# Patient Record
Sex: FEMALE | Race: WHITE | Employment: OTHER | URBAN - METROPOLITAN AREA
[De-identification: names, ages, dates, MRNs, and addresses within clinical notes are randomized per-mention and may not be internally consistent; named-entity substitution may affect disease eponyms.]

---

## 2017-01-01 ENCOUNTER — DOCUMENTATION ONLY (OUTPATIENT)
Dept: CARE COORDINATION | Facility: CLINIC | Age: 82
End: 2017-01-01

## 2017-01-01 ENCOUNTER — CARE COORDINATION (OUTPATIENT)
Dept: GERIATRIC MEDICINE | Facility: CLINIC | Age: 82
End: 2017-01-01

## 2017-01-01 ENCOUNTER — MYC MEDICAL ADVICE (OUTPATIENT)
Dept: FAMILY MEDICINE | Facility: OTHER | Age: 82
End: 2017-01-01

## 2017-01-01 ENCOUNTER — TELEPHONE (OUTPATIENT)
Dept: FAMILY MEDICINE | Facility: OTHER | Age: 82
End: 2017-01-01

## 2017-01-01 ENCOUNTER — HOSPITAL ENCOUNTER (EMERGENCY)
Facility: CLINIC | Age: 82
Discharge: HOME | End: 2017-02-04
Attending: FAMILY MEDICINE | Admitting: FAMILY MEDICINE
Payer: COMMERCIAL

## 2017-01-01 ENCOUNTER — CARE COORDINATION (OUTPATIENT)
Dept: CARE COORDINATION | Facility: CLINIC | Age: 82
End: 2017-01-01

## 2017-01-01 ENCOUNTER — DOCUMENTATION ONLY (OUTPATIENT)
Dept: OTHER | Facility: CLINIC | Age: 82
End: 2017-01-01

## 2017-01-01 ENCOUNTER — OFFICE VISIT (OUTPATIENT)
Dept: FAMILY MEDICINE | Facility: OTHER | Age: 82
End: 2017-01-01
Payer: COMMERCIAL

## 2017-01-01 ENCOUNTER — RADIANT APPOINTMENT (OUTPATIENT)
Dept: GENERAL RADIOLOGY | Facility: OTHER | Age: 82
End: 2017-01-01
Attending: PHYSICIAN ASSISTANT
Payer: COMMERCIAL

## 2017-01-01 ENCOUNTER — TELEPHONE (OUTPATIENT)
Dept: CARE COORDINATION | Facility: CLINIC | Age: 82
End: 2017-01-01

## 2017-01-01 ENCOUNTER — VIRTUAL VISIT (OUTPATIENT)
Dept: PHARMACY | Facility: CLINIC | Age: 82
End: 2017-01-01
Payer: COMMERCIAL

## 2017-01-01 ENCOUNTER — NURSE TRIAGE (OUTPATIENT)
Dept: NURSING | Facility: CLINIC | Age: 82
End: 2017-01-01

## 2017-01-01 ENCOUNTER — ALLIED HEALTH/NURSE VISIT (OUTPATIENT)
Dept: PHARMACY | Facility: CLINIC | Age: 82
End: 2017-01-01
Payer: COMMERCIAL

## 2017-01-01 ENCOUNTER — MEDICAL CORRESPONDENCE (OUTPATIENT)
Dept: HEALTH INFORMATION MANAGEMENT | Facility: CLINIC | Age: 82
End: 2017-01-01

## 2017-01-01 ENCOUNTER — TELEPHONE (OUTPATIENT)
Dept: PHARMACY | Facility: CLINIC | Age: 82
End: 2017-01-01

## 2017-01-01 ENCOUNTER — RADIANT APPOINTMENT (OUTPATIENT)
Dept: GENERAL RADIOLOGY | Facility: OTHER | Age: 82
End: 2017-01-01
Attending: NURSE PRACTITIONER
Payer: COMMERCIAL

## 2017-01-01 ENCOUNTER — TELEPHONE (OUTPATIENT)
Dept: NURSING | Facility: CLINIC | Age: 82
End: 2017-01-01

## 2017-01-01 VITALS
WEIGHT: 123 LBS | OXYGEN SATURATION: 96 % | BODY MASS INDEX: 24.15 KG/M2 | SYSTOLIC BLOOD PRESSURE: 172 MMHG | HEIGHT: 60 IN | TEMPERATURE: 97.2 F | RESPIRATION RATE: 20 BRPM | DIASTOLIC BLOOD PRESSURE: 96 MMHG

## 2017-01-01 VITALS
RESPIRATION RATE: 16 BRPM | SYSTOLIC BLOOD PRESSURE: 116 MMHG | TEMPERATURE: 97.2 F | BODY MASS INDEX: 23.24 KG/M2 | HEART RATE: 80 BPM | WEIGHT: 119 LBS | DIASTOLIC BLOOD PRESSURE: 62 MMHG | OXYGEN SATURATION: 97 %

## 2017-01-01 VITALS
SYSTOLIC BLOOD PRESSURE: 108 MMHG | WEIGHT: 123 LBS | RESPIRATION RATE: 20 BRPM | OXYGEN SATURATION: 98 % | BODY MASS INDEX: 24.02 KG/M2 | HEART RATE: 77 BPM | TEMPERATURE: 95.4 F | DIASTOLIC BLOOD PRESSURE: 58 MMHG

## 2017-01-01 VITALS
OXYGEN SATURATION: 94 % | SYSTOLIC BLOOD PRESSURE: 120 MMHG | RESPIRATION RATE: 20 BRPM | HEART RATE: 102 BPM | DIASTOLIC BLOOD PRESSURE: 50 MMHG | TEMPERATURE: 98.9 F

## 2017-01-01 VITALS
SYSTOLIC BLOOD PRESSURE: 134 MMHG | RESPIRATION RATE: 24 BRPM | BODY MASS INDEX: 24.02 KG/M2 | DIASTOLIC BLOOD PRESSURE: 66 MMHG | OXYGEN SATURATION: 98 % | WEIGHT: 123 LBS | HEART RATE: 90 BPM | TEMPERATURE: 95.8 F

## 2017-01-01 VITALS
TEMPERATURE: 96.9 F | BODY MASS INDEX: 23.24 KG/M2 | DIASTOLIC BLOOD PRESSURE: 50 MMHG | HEART RATE: 90 BPM | OXYGEN SATURATION: 95 % | RESPIRATION RATE: 20 BRPM | WEIGHT: 119 LBS | SYSTOLIC BLOOD PRESSURE: 102 MMHG

## 2017-01-01 VITALS — TEMPERATURE: 98.3 F | SYSTOLIC BLOOD PRESSURE: 100 MMHG | DIASTOLIC BLOOD PRESSURE: 56 MMHG | HEART RATE: 84 BPM

## 2017-01-01 VITALS
DIASTOLIC BLOOD PRESSURE: 62 MMHG | BODY MASS INDEX: 23.24 KG/M2 | WEIGHT: 119 LBS | RESPIRATION RATE: 20 BRPM | SYSTOLIC BLOOD PRESSURE: 120 MMHG | TEMPERATURE: 97.6 F | OXYGEN SATURATION: 98 % | HEART RATE: 102 BPM

## 2017-01-01 DIAGNOSIS — R50.9 FEVER, UNSPECIFIED FEVER CAUSE: ICD-10-CM

## 2017-01-01 DIAGNOSIS — R21 RASH AND NONSPECIFIC SKIN ERUPTION: Primary | ICD-10-CM

## 2017-01-01 DIAGNOSIS — R52 PAIN: ICD-10-CM

## 2017-01-01 DIAGNOSIS — J30.2 SEASONAL ALLERGIC RHINITIS, UNSPECIFIED ALLERGIC RHINITIS TRIGGER: ICD-10-CM

## 2017-01-01 DIAGNOSIS — R30.0 DYSURIA: Primary | ICD-10-CM

## 2017-01-01 DIAGNOSIS — S63.92XA SPRAIN OF LEFT HAND, INITIAL ENCOUNTER: Primary | ICD-10-CM

## 2017-01-01 DIAGNOSIS — E11.9 TYPE 2 DIABETES MELLITUS WITHOUT COMPLICATION, WITHOUT LONG-TERM CURRENT USE OF INSULIN (H): ICD-10-CM

## 2017-01-01 DIAGNOSIS — N30.00 ACUTE CYSTITIS WITHOUT HEMATURIA: Primary | ICD-10-CM

## 2017-01-01 DIAGNOSIS — Z87.440 HISTORY OF FREQUENT URINARY TRACT INFECTIONS: ICD-10-CM

## 2017-01-01 DIAGNOSIS — R82.90 NONSPECIFIC FINDING ON EXAMINATION OF URINE: Primary | ICD-10-CM

## 2017-01-01 DIAGNOSIS — I10 HYPERTENSION GOAL BP (BLOOD PRESSURE) < 140/90: ICD-10-CM

## 2017-01-01 DIAGNOSIS — R35.0 URINARY FREQUENCY: ICD-10-CM

## 2017-01-01 DIAGNOSIS — E11.40 TYPE 2 DIABETES MELLITUS WITH DIABETIC NEUROPATHY, WITHOUT LONG-TERM CURRENT USE OF INSULIN (H): ICD-10-CM

## 2017-01-01 DIAGNOSIS — E03.9 ACQUIRED HYPOTHYROIDISM: ICD-10-CM

## 2017-01-01 DIAGNOSIS — L98.9 SKIN LESION: Primary | ICD-10-CM

## 2017-01-01 DIAGNOSIS — R73.9 HYPERGLYCEMIA: ICD-10-CM

## 2017-01-01 DIAGNOSIS — Z78.0 POST-MENOPAUSE: ICD-10-CM

## 2017-01-01 DIAGNOSIS — M25.50 MULTIPLE JOINT PAIN: Primary | ICD-10-CM

## 2017-01-01 DIAGNOSIS — N39.0 RECURRENT UTI (URINARY TRACT INFECTION): ICD-10-CM

## 2017-01-01 DIAGNOSIS — Q74.2 TOE ANOMALY: Primary | ICD-10-CM

## 2017-01-01 DIAGNOSIS — N39.0 URINARY TRACT INFECTION, SITE NOT SPECIFIED: ICD-10-CM

## 2017-01-01 DIAGNOSIS — N39.46 MIXED STRESS AND URGE URINARY INCONTINENCE: ICD-10-CM

## 2017-01-01 DIAGNOSIS — I25.10 CORONARY ARTERY DISEASE INVOLVING NATIVE HEART WITHOUT ANGINA PECTORIS, UNSPECIFIED VESSEL OR LESION TYPE: ICD-10-CM

## 2017-01-01 DIAGNOSIS — E63.9 NUTRITIONAL DEFICIENCY: ICD-10-CM

## 2017-01-01 DIAGNOSIS — D51.9 ANEMIA DUE TO VITAMIN B12 DEFICIENCY, UNSPECIFIED B12 DEFICIENCY TYPE: ICD-10-CM

## 2017-01-01 DIAGNOSIS — L24.9 IRRITANT CONTACT DERMATITIS, UNSPECIFIED TRIGGER: Primary | ICD-10-CM

## 2017-01-01 DIAGNOSIS — E22.2 SIADH (SYNDROME OF INAPPROPRIATE ADH PRODUCTION) (H): ICD-10-CM

## 2017-01-01 DIAGNOSIS — F25.9 SCHIZOAFFECTIVE DISORDER, UNSPECIFIED (H): ICD-10-CM

## 2017-01-01 DIAGNOSIS — N39.0 URINARY TRACT INFECTION WITHOUT HEMATURIA, SITE UNSPECIFIED: ICD-10-CM

## 2017-01-01 DIAGNOSIS — N39.0 URINARY TRACT INFECTION WITHOUT HEMATURIA, SITE UNSPECIFIED: Primary | ICD-10-CM

## 2017-01-01 DIAGNOSIS — E11.9 TYPE 2 DIABETES MELLITUS WITHOUT COMPLICATION, WITHOUT LONG-TERM CURRENT USE OF INSULIN (H): Primary | ICD-10-CM

## 2017-01-01 DIAGNOSIS — E11.40 TYPE 2 DIABETES MELLITUS WITH DIABETIC NEUROPATHY, WITHOUT LONG-TERM CURRENT USE OF INSULIN (H): Primary | ICD-10-CM

## 2017-01-01 DIAGNOSIS — N89.8 VAGINAL ITCHING: Primary | ICD-10-CM

## 2017-01-01 DIAGNOSIS — E78.5 HYPERLIPIDEMIA LDL GOAL <160: ICD-10-CM

## 2017-01-01 DIAGNOSIS — I10 HYPERTENSION GOAL BP (BLOOD PRESSURE) < 140/90: Primary | ICD-10-CM

## 2017-01-01 DIAGNOSIS — K59.00 CONSTIPATION, UNSPECIFIED CONSTIPATION TYPE: ICD-10-CM

## 2017-01-01 DIAGNOSIS — R56.9 SEIZURE (H): Primary | ICD-10-CM

## 2017-01-01 DIAGNOSIS — E11.65 TYPE 2 DIABETES MELLITUS WITH HYPERGLYCEMIA, WITHOUT LONG-TERM CURRENT USE OF INSULIN (H): ICD-10-CM

## 2017-01-01 DIAGNOSIS — E11.40 TYPE 2 DIABETES MELLITUS WITH DIABETIC NEUROPATHY (H): ICD-10-CM

## 2017-01-01 DIAGNOSIS — R19.7 DIARRHEA OF PRESUMED INFECTIOUS ORIGIN: Primary | ICD-10-CM

## 2017-01-01 DIAGNOSIS — R56.9 SEIZURE (H): ICD-10-CM

## 2017-01-01 DIAGNOSIS — N30.01 ACUTE CYSTITIS WITH HEMATURIA: Primary | ICD-10-CM

## 2017-01-01 DIAGNOSIS — R53.83 FATIGUE, UNSPECIFIED TYPE: ICD-10-CM

## 2017-01-01 DIAGNOSIS — E78.5 HYPERLIPIDEMIA LDL GOAL <100: ICD-10-CM

## 2017-01-01 DIAGNOSIS — E53.8 VITAMIN B 12 DEFICIENCY: Primary | ICD-10-CM

## 2017-01-01 DIAGNOSIS — E87.1 CHRONIC HYPONATREMIA: ICD-10-CM

## 2017-01-01 DIAGNOSIS — E78.5 HYPERLIPIDEMIA LDL GOAL <160: Primary | ICD-10-CM

## 2017-01-01 DIAGNOSIS — R19.7 DIARRHEA OF PRESUMED INFECTIOUS ORIGIN: ICD-10-CM

## 2017-01-01 DIAGNOSIS — N39.0 RECURRENT UTI (URINARY TRACT INFECTION): Primary | ICD-10-CM

## 2017-01-01 DIAGNOSIS — R30.0 DYSURIA: ICD-10-CM

## 2017-01-01 DIAGNOSIS — R41.82 ALTERED MENTAL STATUS, UNSPECIFIED ALTERED MENTAL STATUS TYPE: ICD-10-CM

## 2017-01-01 DIAGNOSIS — K59.00 CONSTIPATION, UNSPECIFIED CONSTIPATION TYPE: Primary | ICD-10-CM

## 2017-01-01 DIAGNOSIS — N39.46 MIXED STRESS AND URGE URINARY INCONTINENCE: Primary | ICD-10-CM

## 2017-01-01 DIAGNOSIS — E53.8 VITAMIN B 12 DEFICIENCY: ICD-10-CM

## 2017-01-01 DIAGNOSIS — I25.10 CAD (CORONARY ARTERY DISEASE): ICD-10-CM

## 2017-01-01 DIAGNOSIS — R21 RASH AND NONSPECIFIC SKIN ERUPTION: ICD-10-CM

## 2017-01-01 DIAGNOSIS — E87.1 HYPONATREMIA: Primary | ICD-10-CM

## 2017-01-01 DIAGNOSIS — R41.0 CONFUSION: Primary | ICD-10-CM

## 2017-01-01 DIAGNOSIS — K21.9 GASTROESOPHAGEAL REFLUX DISEASE WITHOUT ESOPHAGITIS: ICD-10-CM

## 2017-01-01 DIAGNOSIS — R41.0 DISORIENTATION: Primary | ICD-10-CM

## 2017-01-01 DIAGNOSIS — R41.89 COGNITIVE IMPAIRMENT: ICD-10-CM

## 2017-01-01 DIAGNOSIS — R41.82 ALTERED MENTAL STATUS, UNSPECIFIED ALTERED MENTAL STATUS TYPE: Primary | ICD-10-CM

## 2017-01-01 DIAGNOSIS — N30.01 ACUTE CYSTITIS WITH HEMATURIA: ICD-10-CM

## 2017-01-01 DIAGNOSIS — Z76.89 ESTABLISHING CARE WITH NEW DOCTOR, ENCOUNTER FOR: Primary | ICD-10-CM

## 2017-01-01 DIAGNOSIS — Z71.89 ADVANCED DIRECTIVES, COUNSELING/DISCUSSION: Chronic | ICD-10-CM

## 2017-01-01 DIAGNOSIS — Z53.9 ERRONEOUS ENCOUNTER--DISREGARD: Primary | ICD-10-CM

## 2017-01-01 DIAGNOSIS — J30.2 SEASONAL ALLERGIC RHINITIS: ICD-10-CM

## 2017-01-01 DIAGNOSIS — Q74.2 TOE ANOMALY: ICD-10-CM

## 2017-01-01 DIAGNOSIS — E11.8 TYPE 2 DIABETES MELLITUS WITH COMPLICATION, WITHOUT LONG-TERM CURRENT USE OF INSULIN (H): Primary | ICD-10-CM

## 2017-01-01 DIAGNOSIS — E11.9 TYPE 2 DIABETES, HBA1C GOAL < 7% (H): ICD-10-CM

## 2017-01-01 DIAGNOSIS — Z23 NEED FOR PROPHYLACTIC VACCINATION AND INOCULATION AGAINST INFLUENZA: ICD-10-CM

## 2017-01-01 DIAGNOSIS — Z87.440 HISTORY OF RECURRENT UTIS: ICD-10-CM

## 2017-01-01 DIAGNOSIS — M25.50 MULTIPLE JOINT PAIN: ICD-10-CM

## 2017-01-01 DIAGNOSIS — R50.9 FEVER, UNSPECIFIED FEVER CAUSE: Primary | ICD-10-CM

## 2017-01-01 DIAGNOSIS — R53.83 FATIGUE, UNSPECIFIED TYPE: Primary | ICD-10-CM

## 2017-01-01 LAB
ALBUMIN SERPL-MCNC: 2.8 G/DL (ref 3.4–5)
ALBUMIN UR-MCNC: 30 MG/DL
ALBUMIN UR-MCNC: >=300 MG/DL
ALBUMIN UR-MCNC: NEGATIVE MG/DL
ALP SERPL-CCNC: 74 U/L (ref 40–150)
ALT SERPL W P-5'-P-CCNC: 21 U/L (ref 0–50)
ANION GAP SERPL CALCULATED.3IONS-SCNC: 10 MMOL/L (ref 3–14)
ANION GAP SERPL CALCULATED.3IONS-SCNC: 10 MMOL/L (ref 3–14)
ANION GAP SERPL CALCULATED.3IONS-SCNC: 12 MMOL/L (ref 3–14)
ANION GAP SERPL CALCULATED.3IONS-SCNC: 7 MMOL/L (ref 3–14)
ANION GAP SERPL CALCULATED.3IONS-SCNC: 8 MMOL/L (ref 3–14)
ANION GAP SERPL CALCULATED.3IONS-SCNC: 9 MMOL/L (ref 3–14)
APPEARANCE UR: ABNORMAL
APPEARANCE UR: CLEAR
AST SERPL W P-5'-P-CCNC: 18 U/L (ref 0–45)
BACTERIA #/AREA URNS HPF: ABNORMAL /HPF
BACTERIA SPEC CULT: ABNORMAL
BACTERIA SPEC CULT: NORMAL
BILIRUB SERPL-MCNC: 0.3 MG/DL (ref 0.2–1.3)
BILIRUB UR QL STRIP: NEGATIVE
BUN SERPL-MCNC: 15 MG/DL (ref 7–30)
BUN SERPL-MCNC: 18 MG/DL (ref 7–30)
BUN SERPL-MCNC: 18 MG/DL (ref 7–30)
BUN SERPL-MCNC: 20 MG/DL (ref 7–30)
BUN SERPL-MCNC: 20 MG/DL (ref 7–30)
BUN SERPL-MCNC: 25 MG/DL (ref 7–30)
C COLI+JEJUNI+LARI FUSA STL QL NAA+PROBE: NOT DETECTED
C DIFF TOX B STL QL: NORMAL
CALCIUM SERPL-MCNC: 8.5 MG/DL (ref 8.5–10.1)
CALCIUM SERPL-MCNC: 8.8 MG/DL (ref 8.5–10.1)
CALCIUM SERPL-MCNC: 8.8 MG/DL (ref 8.5–10.1)
CALCIUM SERPL-MCNC: 8.9 MG/DL (ref 8.5–10.1)
CHLORIDE SERPL-SCNC: 91 MMOL/L (ref 94–109)
CHLORIDE SERPL-SCNC: 92 MMOL/L (ref 94–109)
CHLORIDE SERPL-SCNC: 93 MMOL/L (ref 94–109)
CHLORIDE SERPL-SCNC: 94 MMOL/L (ref 94–109)
CHLORIDE SERPL-SCNC: 96 MMOL/L (ref 94–109)
CHLORIDE SERPL-SCNC: 97 MMOL/L (ref 94–109)
CO2 SERPL-SCNC: 25 MMOL/L (ref 20–32)
CO2 SERPL-SCNC: 26 MMOL/L (ref 20–32)
CO2 SERPL-SCNC: 27 MMOL/L (ref 20–32)
CO2 SERPL-SCNC: 27 MMOL/L (ref 20–32)
CO2 SERPL-SCNC: 29 MMOL/L (ref 20–32)
CO2 SERPL-SCNC: 31 MMOL/L (ref 20–32)
COLOR UR AUTO: YELLOW
CREAT SERPL-MCNC: 0.52 MG/DL (ref 0.52–1.04)
CREAT SERPL-MCNC: 0.62 MG/DL (ref 0.52–1.04)
CREAT SERPL-MCNC: 0.65 MG/DL (ref 0.52–1.04)
CREAT SERPL-MCNC: 0.65 MG/DL (ref 0.52–1.04)
CREAT SERPL-MCNC: 0.66 MG/DL (ref 0.52–1.04)
CREAT SERPL-MCNC: 0.75 MG/DL (ref 0.52–1.04)
CREAT UR-MCNC: 30 MG/DL
EC STX1 GENE STL QL NAA+PROBE: NOT DETECTED
EC STX2 GENE STL QL NAA+PROBE: NOT DETECTED
ENTERIC PATHOGEN COMMENT: NORMAL
ERYTHROCYTE [DISTWIDTH] IN BLOOD BY AUTOMATED COUNT: 13.6 % (ref 10–15)
G LAMBLIA AG STL QL IA: NORMAL
GFR SERPL CREATININE-BSD FRML MDRD: 73 ML/MIN/1.7M2
GFR SERPL CREATININE-BSD FRML MDRD: 84 ML/MIN/1.7M2
GFR SERPL CREATININE-BSD FRML MDRD: 87 ML/MIN/1.7M2
GFR SERPL CREATININE-BSD FRML MDRD: 87 ML/MIN/1.7M2
GFR SERPL CREATININE-BSD FRML MDRD: >90 ML/MIN/1.7M2
GFR SERPL CREATININE-BSD FRML MDRD: ABNORMAL ML/MIN/1.7M2
GLUCOSE SERPL-MCNC: 116 MG/DL (ref 70–99)
GLUCOSE SERPL-MCNC: 135 MG/DL (ref 70–99)
GLUCOSE SERPL-MCNC: 157 MG/DL (ref 70–99)
GLUCOSE SERPL-MCNC: 180 MG/DL (ref 70–99)
GLUCOSE SERPL-MCNC: 202 MG/DL (ref 70–99)
GLUCOSE SERPL-MCNC: 241 MG/DL (ref 70–99)
GLUCOSE UR STRIP-MCNC: 50 MG/DL
GLUCOSE UR STRIP-MCNC: NEGATIVE MG/DL
HBA1C MFR BLD: 7 % (ref 4.3–6)
HBA1C MFR BLD: 7.8 % (ref 4.3–6)
HCT VFR BLD AUTO: 33.1 % (ref 35–47)
HGB BLD-MCNC: 11.3 G/DL (ref 11.7–15.7)
HGB UR QL STRIP: ABNORMAL
HGB UR QL STRIP: NEGATIVE
KETONES UR STRIP-MCNC: NEGATIVE MG/DL
LDLC SERPL DIRECT ASSAY-MCNC: 62 MG/DL
LEUKOCYTE ESTERASE UR QL STRIP: ABNORMAL
Lab: ABNORMAL
Lab: ABNORMAL
Lab: NORMAL
MCH RBC QN AUTO: 28 PG (ref 26.5–33)
MCHC RBC AUTO-ENTMCNC: 34.1 G/DL (ref 31.5–36.5)
MCV RBC AUTO: 82 FL (ref 78–100)
MICRO REPORT STATUS: ABNORMAL
MICRO REPORT STATUS: NORMAL
MICROALBUMIN UR-MCNC: 28 MG/L
MICROALBUMIN/CREAT UR: 93.96 MG/G CR (ref 0–25)
MICROORGANISM SPEC CULT: ABNORMAL
MUCOUS THREADS #/AREA URNS LPF: PRESENT /LPF
NITRATE UR QL: NEGATIVE
NITRATE UR QL: POSITIVE
NON-SQ EPI CELLS #/AREA URNS LPF: ABNORMAL /LPF
NOROV GI+II ORF1-ORF2 JNC STL QL NAA+PR: NOT DETECTED
O+P STL MICRO: NORMAL
PH UR STRIP: 5.5 PH (ref 5–7)
PH UR STRIP: 6 PH (ref 5–7)
PH UR STRIP: 6 PH (ref 5–7)
PH UR STRIP: 6.5 PH (ref 5–7)
PLATELET # BLD AUTO: 318 10E9/L (ref 150–450)
POTASSIUM SERPL-SCNC: 3.9 MMOL/L (ref 3.4–5.3)
POTASSIUM SERPL-SCNC: 4 MMOL/L (ref 3.4–5.3)
POTASSIUM SERPL-SCNC: 4.3 MMOL/L (ref 3.4–5.3)
POTASSIUM SERPL-SCNC: 4.3 MMOL/L (ref 3.4–5.3)
POTASSIUM SERPL-SCNC: 4.5 MMOL/L (ref 3.4–5.3)
POTASSIUM SERPL-SCNC: 4.6 MMOL/L (ref 3.4–5.3)
PROT SERPL-MCNC: 6.7 G/DL (ref 6.8–8.8)
RBC # BLD AUTO: 4.03 10E12/L (ref 3.8–5.2)
RBC #/AREA URNS AUTO: 4 /HPF (ref 0–2)
RBC #/AREA URNS AUTO: ABNORMAL /HPF
RBC #/AREA URNS AUTO: ABNORMAL /HPF
RBC #/AREA URNS AUTO: ABNORMAL /HPF (ref 0–2)
RVA NSP5 STL QL NAA+PROBE: NOT DETECTED
SALMONELLA SP RPOD STL QL NAA+PROBE: NOT DETECTED
SHIGELLA SP+EIEC IPAH STL QL NAA+PROBE: NOT DETECTED
SODIUM SERPL-SCNC: 127 MMOL/L (ref 133–144)
SODIUM SERPL-SCNC: 129 MMOL/L (ref 133–144)
SODIUM SERPL-SCNC: 130 MMOL/L (ref 133–144)
SODIUM SERPL-SCNC: 131 MMOL/L (ref 133–144)
SODIUM SERPL-SCNC: 133 MMOL/L (ref 133–144)
SODIUM SERPL-SCNC: 134 MMOL/L (ref 133–144)
SOURCE: ABNORMAL
SP GR UR STRIP: 1.01 (ref 1–1.03)
SP GR UR STRIP: 1.02 (ref 1–1.03)
SP GR UR STRIP: <=1.005 (ref 1–1.03)
SPECIMEN SOURCE: ABNORMAL
SPECIMEN SOURCE: NORMAL
SQUAMOUS #/AREA URNS AUTO: 7 /HPF (ref 0–1)
T4 FREE SERPL-MCNC: 1.11 NG/DL (ref 0.76–1.46)
TSH SERPL DL<=0.005 MIU/L-ACNC: 1.34 MU/L (ref 0.4–4)
URN SPEC COLLECT METH UR: ABNORMAL
UROBILINOGEN UR STRIP-ACNC: 0.2 EU/DL (ref 0.2–1)
UROBILINOGEN UR STRIP-MCNC: 0 MG/DL (ref 0–2)
V CHOL+PARA RFBL+TRKH+TNAA STL QL NAA+PR: NOT DETECTED
VIT B12 SERPL-MCNC: 843 PG/ML (ref 193–986)
WBC # BLD AUTO: 6.2 10E9/L (ref 4–11)
WBC #/AREA URNS AUTO: 4 /HPF (ref 0–2)
WBC #/AREA URNS AUTO: >100 /HPF
WBC #/AREA URNS AUTO: >100 /HPF (ref 0–2)
WBC #/AREA URNS AUTO: >100 /HPF (ref 0–2)
WBC #/AREA URNS AUTO: ABNORMAL /HPF
WBC #/AREA URNS AUTO: ABNORMAL /HPF (ref 0–2)
Y ENTERO RECN STL QL NAA+PROBE: NOT DETECTED
YEAST #/AREA URNS HPF: ABNORMAL /HPF

## 2017-01-01 PROCEDURE — 87086 URINE CULTURE/COLONY COUNT: CPT | Performed by: NURSE PRACTITIONER

## 2017-01-01 PROCEDURE — 87088 URINE BACTERIA CULTURE: CPT | Performed by: NURSE PRACTITIONER

## 2017-01-01 PROCEDURE — 99214 OFFICE O/P EST MOD 30 MIN: CPT | Performed by: NURSE PRACTITIONER

## 2017-01-01 PROCEDURE — 81001 URINALYSIS AUTO W/SCOPE: CPT | Performed by: NURSE PRACTITIONER

## 2017-01-01 PROCEDURE — 85027 COMPLETE CBC AUTOMATED: CPT | Performed by: NURSE PRACTITIONER

## 2017-01-01 PROCEDURE — 25000128 H RX IP 250 OP 636: Performed by: FAMILY MEDICINE

## 2017-01-01 PROCEDURE — 87086 URINE CULTURE/COLONY COUNT: CPT | Performed by: FAMILY MEDICINE

## 2017-01-01 PROCEDURE — 99283 EMERGENCY DEPT VISIT LOW MDM: CPT | Performed by: FAMILY MEDICINE

## 2017-01-01 PROCEDURE — 99284 EMERGENCY DEPT VISIT MOD MDM: CPT | Mod: 25

## 2017-01-01 PROCEDURE — 87186 SC STD MICRODIL/AGAR DIL: CPT | Performed by: FAMILY MEDICINE

## 2017-01-01 PROCEDURE — 81001 URINALYSIS AUTO W/SCOPE: CPT | Performed by: FAMILY MEDICINE

## 2017-01-01 PROCEDURE — 73660 X-RAY EXAM OF TOE(S): CPT | Mod: LT

## 2017-01-01 PROCEDURE — 87177 OVA AND PARASITES SMEARS: CPT | Performed by: NURSE PRACTITIONER

## 2017-01-01 PROCEDURE — 36415 COLL VENOUS BLD VENIPUNCTURE: CPT | Performed by: NURSE PRACTITIONER

## 2017-01-01 PROCEDURE — 99214 OFFICE O/P EST MOD 30 MIN: CPT | Performed by: PHYSICIAN ASSISTANT

## 2017-01-01 PROCEDURE — 87088 URINE BACTERIA CULTURE: CPT | Performed by: FAMILY MEDICINE

## 2017-01-01 PROCEDURE — 87088 URINE BACTERIA CULTURE: CPT | Performed by: INTERNAL MEDICINE

## 2017-01-01 PROCEDURE — 25000125 ZZHC RX 250: Performed by: FAMILY MEDICINE

## 2017-01-01 PROCEDURE — 84443 ASSAY THYROID STIM HORMONE: CPT | Performed by: NURSE PRACTITIONER

## 2017-01-01 PROCEDURE — 80048 BASIC METABOLIC PNL TOTAL CA: CPT | Performed by: FAMILY MEDICINE

## 2017-01-01 PROCEDURE — 99606 MTMS BY PHARM EST 15 MIN: CPT | Performed by: PHARMACIST

## 2017-01-01 PROCEDURE — 80048 BASIC METABOLIC PNL TOTAL CA: CPT | Performed by: NURSE PRACTITIONER

## 2017-01-01 PROCEDURE — 83036 HEMOGLOBIN GLYCOSYLATED A1C: CPT | Performed by: NURSE PRACTITIONER

## 2017-01-01 PROCEDURE — 83721 ASSAY OF BLOOD LIPOPROTEIN: CPT | Performed by: NURSE PRACTITIONER

## 2017-01-01 PROCEDURE — 99607 MTMS BY PHARM ADDL 15 MIN: CPT | Performed by: PHARMACIST

## 2017-01-01 PROCEDURE — 82043 UR ALBUMIN QUANTITATIVE: CPT | Performed by: NURSE PRACTITIONER

## 2017-01-01 PROCEDURE — 71020 XR CHEST 2 VW: CPT

## 2017-01-01 PROCEDURE — 82607 VITAMIN B-12: CPT | Performed by: FAMILY MEDICINE

## 2017-01-01 PROCEDURE — 87209 SMEAR COMPLEX STAIN: CPT | Performed by: NURSE PRACTITIONER

## 2017-01-01 PROCEDURE — 87186 SC STD MICRODIL/AGAR DIL: CPT | Performed by: INTERNAL MEDICINE

## 2017-01-01 PROCEDURE — G0008 ADMIN INFLUENZA VIRUS VAC: HCPCS | Performed by: NURSE PRACTITIONER

## 2017-01-01 PROCEDURE — 99214 OFFICE O/P EST MOD 30 MIN: CPT | Mod: 25 | Performed by: NURSE PRACTITIONER

## 2017-01-01 PROCEDURE — 87186 SC STD MICRODIL/AGAR DIL: CPT | Performed by: NURSE PRACTITIONER

## 2017-01-01 PROCEDURE — 84439 ASSAY OF FREE THYROXINE: CPT | Performed by: FAMILY MEDICINE

## 2017-01-01 PROCEDURE — 80048 BASIC METABOLIC PNL TOTAL CA: CPT | Performed by: INTERNAL MEDICINE

## 2017-01-01 PROCEDURE — 96372 THER/PROPH/DIAG INJ SC/IM: CPT

## 2017-01-01 PROCEDURE — 87086 URINE CULTURE/COLONY COUNT: CPT | Performed by: INTERNAL MEDICINE

## 2017-01-01 PROCEDURE — 87329 GIARDIA AG IA: CPT | Performed by: NURSE PRACTITIONER

## 2017-01-01 PROCEDURE — 87493 C DIFF AMPLIFIED PROBE: CPT | Performed by: NURSE PRACTITIONER

## 2017-01-01 PROCEDURE — 81001 URINALYSIS AUTO W/SCOPE: CPT | Performed by: INTERNAL MEDICINE

## 2017-01-01 PROCEDURE — 90662 IIV NO PRSV INCREASED AG IM: CPT | Performed by: NURSE PRACTITIONER

## 2017-01-01 PROCEDURE — 87506 IADNA-DNA/RNA PROBE TQ 6-11: CPT | Performed by: NURSE PRACTITIONER

## 2017-01-01 PROCEDURE — 80053 COMPREHEN METABOLIC PANEL: CPT | Performed by: NURSE PRACTITIONER

## 2017-01-01 RX ORDER — RAMIPRIL 5 MG/1
CAPSULE ORAL
Qty: 28 CAPSULE | Refills: 5 | Status: SHIPPED | OUTPATIENT
Start: 2017-01-01

## 2017-01-01 RX ORDER — MICONAZOLE NITRATE 2 %
1 CREAM WITH APPLICATOR VAGINAL AT BEDTIME
Qty: 45 G | Refills: 0 | Status: SHIPPED | OUTPATIENT
Start: 2017-01-01 | End: 2017-01-01

## 2017-01-01 RX ORDER — CEFTRIAXONE 1 G/1
INJECTION, POWDER, FOR SOLUTION INTRAMUSCULAR; INTRAVENOUS
Qty: 10 ML | Refills: 5 | Status: SHIPPED | OUTPATIENT
Start: 2017-01-01 | End: 2017-01-01

## 2017-01-01 RX ORDER — LEVOTHYROXINE SODIUM 50 UG/1
TABLET ORAL
Qty: 31 TABLET | Refills: 3 | Status: SHIPPED | OUTPATIENT
Start: 2017-01-01 | End: 2017-01-01

## 2017-01-01 RX ORDER — SILVER SULFADIAZINE 10 MG/G
CREAM TOPICAL 2 TIMES DAILY PRN
Qty: 50 G | Refills: 1 | Status: SHIPPED | OUTPATIENT
Start: 2017-01-01 | End: 2017-01-01

## 2017-01-01 RX ORDER — LIDOCAINE HYDROCHLORIDE 10 MG/ML
INJECTION, SOLUTION INFILTRATION; PERINEURAL
Qty: 20 ML | Refills: 1 | Status: SHIPPED | OUTPATIENT
Start: 2017-01-01 | End: 2017-01-01

## 2017-01-01 RX ORDER — LOVASTATIN 10 MG
TABLET ORAL
Qty: 28 TABLET | Refills: 0 | Status: SHIPPED | OUTPATIENT
Start: 2017-01-01

## 2017-01-01 RX ORDER — RAMIPRIL 10 MG/1
10 CAPSULE ORAL DAILY
Qty: 62 CAPSULE | Refills: 3 | Status: SHIPPED | OUTPATIENT
Start: 2017-01-01 | End: 2017-01-01

## 2017-01-01 RX ORDER — NITROFURANTOIN 25; 75 MG/1; MG/1
100 CAPSULE ORAL 2 TIMES DAILY
Qty: 14 CAPSULE | Refills: 0 | Status: SHIPPED | OUTPATIENT
Start: 2017-01-01

## 2017-01-01 RX ORDER — UNDERPADS 23" X 36"
EACH MISCELLANEOUS
Qty: 36 EACH | Refills: 0 | Status: SHIPPED | OUTPATIENT
Start: 2017-01-01 | End: 2017-01-01

## 2017-01-01 RX ORDER — LOVASTATIN 10 MG
TABLET ORAL
Qty: 30 TABLET | Refills: 0 | Status: SHIPPED | OUTPATIENT
Start: 2017-01-01 | End: 2017-01-01

## 2017-01-01 RX ORDER — MICONAZOLE NITRATE 2 %
1 CREAM WITH APPLICATOR VAGINAL AT BEDTIME
Qty: 30 G | Refills: 0 | Status: SHIPPED | OUTPATIENT
Start: 2017-01-01 | End: 2017-01-01

## 2017-01-01 RX ORDER — OMEGA-3S/DHA/EPA/FISH OIL/D3 300MG-1000
CAPSULE ORAL
Qty: 56 TABLET | Refills: 0 | Status: SHIPPED | OUTPATIENT
Start: 2017-01-01 | End: 2017-01-01

## 2017-01-01 RX ORDER — LEVOTHYROXINE SODIUM 50 UG/1
TABLET ORAL
Qty: 28 TABLET | Refills: 5 | Status: SHIPPED | OUTPATIENT
Start: 2017-01-01

## 2017-01-01 RX ORDER — AMINO ACIDS/PROTEIN HYDROLYS 15G-100/30
30 LIQUID (ML) ORAL 2 TIMES DAILY
COMMUNITY
End: 2017-01-01

## 2017-01-01 RX ORDER — LOVASTATIN 10 MG
TABLET ORAL
Refills: 0 | COMMUNITY
Start: 2017-01-01 | End: 2017-01-01

## 2017-01-01 RX ORDER — NITROFURANTOIN 25; 75 MG/1; MG/1
100 CAPSULE ORAL 2 TIMES DAILY
Qty: 14 CAPSULE | Refills: 0 | Status: SHIPPED | OUTPATIENT
Start: 2017-01-01 | End: 2017-01-01

## 2017-01-01 RX ORDER — CIPROFLOXACIN 250 MG/1
250 TABLET, FILM COATED ORAL 2 TIMES DAILY
Qty: 14 TABLET | Refills: 0 | Status: SHIPPED | OUTPATIENT
Start: 2017-01-01 | End: 2017-01-01

## 2017-01-01 RX ORDER — AMLODIPINE BESYLATE 5 MG/1
TABLET ORAL
Qty: 31 TABLET | Refills: 3 | Status: SHIPPED | OUTPATIENT
Start: 2017-01-01 | End: 2017-01-01

## 2017-01-01 RX ORDER — CYANOCOBALAMIN 1000 UG/ML
INJECTION, SOLUTION INTRAMUSCULAR; SUBCUTANEOUS
Qty: 1 ML | Refills: 0 | Status: SHIPPED | OUTPATIENT
Start: 2017-01-01 | End: 2017-01-01

## 2017-01-01 RX ORDER — UNDERPADS 23" X 36"
EACH MISCELLANEOUS
Qty: 36 EACH | Refills: 11 | Status: SHIPPED | OUTPATIENT
Start: 2017-01-01 | End: 2017-01-01

## 2017-01-01 RX ORDER — LOVASTATIN 10 MG
TABLET ORAL
Qty: 30 TABLET | Refills: 5 | Status: SHIPPED | OUTPATIENT
Start: 2017-01-01 | End: 2017-01-01

## 2017-01-01 RX ORDER — CEFTRIAXONE 1 G/1
1000 INJECTION, POWDER, FOR SOLUTION INTRAMUSCULAR; INTRAVENOUS DAILY
Qty: 50 ML | Refills: 0 | OUTPATIENT
Start: 2017-01-01 | End: 2017-01-01

## 2017-01-01 RX ORDER — BETAMETHASONE DIPROPIONATE 0.5 MG/G
CREAM TOPICAL
Qty: 30 G | Refills: 0 | Status: SHIPPED | OUTPATIENT
Start: 2017-01-01

## 2017-01-01 RX ORDER — CYANOCOBALAMIN 1000 UG/ML
INJECTION, SOLUTION INTRAMUSCULAR; SUBCUTANEOUS
Qty: 1 ML | Refills: 3 | Status: SHIPPED | OUTPATIENT
Start: 2017-01-01

## 2017-01-01 RX ORDER — OMEGA-3S/DHA/EPA/FISH OIL/D3 300MG-1000
CAPSULE ORAL
Qty: 56 TABLET | Refills: 5 | Status: SHIPPED | OUTPATIENT
Start: 2017-01-01

## 2017-01-01 RX ORDER — AMLODIPINE BESYLATE 5 MG/1
TABLET ORAL
Qty: 28 TABLET | Refills: 5 | Status: SHIPPED | OUTPATIENT
Start: 2017-01-01

## 2017-01-01 RX ORDER — CLOTRIMAZOLE 1 %
CREAM (GRAM) TOPICAL
Qty: 30 G | Refills: 0 | Status: SHIPPED | OUTPATIENT
Start: 2017-01-01

## 2017-01-01 RX ORDER — RAMIPRIL 5 MG/1
5 CAPSULE ORAL DAILY
Qty: 30 CAPSULE | Refills: 3 | Status: SHIPPED | OUTPATIENT
Start: 2017-01-01 | End: 2017-01-01

## 2017-01-01 RX ORDER — PYRIDOXINE HCL (VITAMIN B6) 100 MG
2 TABLET ORAL 2 TIMES DAILY
Qty: 200 CAPSULE | Refills: 6 | Status: SHIPPED | OUTPATIENT
Start: 2017-01-01

## 2017-01-01 RX ORDER — METFORMIN HCL 500 MG
1000 TABLET, EXTENDED RELEASE 24 HR ORAL
Qty: 30 TABLET | Refills: 1 | Status: SHIPPED | OUTPATIENT
Start: 2017-01-01 | End: 2017-01-01

## 2017-01-01 RX ORDER — AMINO ACIDS/PROTEIN HYDROLYS 15G-100/30
LIQUID (ML) ORAL
Qty: 887 ML | Refills: 0 | Status: SHIPPED | OUTPATIENT
Start: 2017-01-01

## 2017-01-01 RX ORDER — OMEGA-3S/DHA/EPA/FISH OIL/D3 300MG-1000
CAPSULE ORAL
Qty: 180 TABLET | Refills: 1 | Status: SHIPPED | OUTPATIENT
Start: 2017-01-01 | End: 2017-01-01

## 2017-01-01 RX ORDER — CYANOCOBALAMIN 1000 UG/ML
INJECTION, SOLUTION INTRAMUSCULAR; SUBCUTANEOUS
Qty: 1 ML | Refills: 4 | Status: SHIPPED | OUTPATIENT
Start: 2017-01-01 | End: 2017-01-01

## 2017-01-01 RX ORDER — DIAPER,BRIEF,ADULT, DISPOSABLE
EACH MISCELLANEOUS
Qty: 140 EACH | Refills: 0 | Status: SHIPPED | OUTPATIENT
Start: 2017-01-01

## 2017-01-01 RX ORDER — AMINO ACIDS/PROTEIN HYDROLYS 15G-100/30
LIQUID (ML) ORAL
Qty: 887 ML | Refills: 0 | Status: SHIPPED | OUTPATIENT
Start: 2017-01-01 | End: 2017-01-01

## 2017-01-01 RX ORDER — SILVER SULFADIAZINE 10 MG/G
CREAM TOPICAL 2 TIMES DAILY
Qty: 50 G | Refills: 1 | Status: SHIPPED | OUTPATIENT
Start: 2017-01-01 | End: 2017-01-01

## 2017-01-01 RX ORDER — CEFTRIAXONE 1 G/1
1000 INJECTION, POWDER, FOR SOLUTION INTRAMUSCULAR; INTRAVENOUS DAILY
Qty: 50 ML | Refills: 0 | Status: SHIPPED | OUTPATIENT
Start: 2017-01-01 | End: 2017-01-01

## 2017-01-01 RX ORDER — METFORMIN HCL 500 MG
TABLET, EXTENDED RELEASE 24 HR ORAL
Qty: 56 TABLET | Refills: 0 | Status: SHIPPED | OUTPATIENT
Start: 2017-01-01

## 2017-01-01 RX ORDER — CEFTRIAXONE 1 G/1
1000 INJECTION, POWDER, FOR SOLUTION INTRAMUSCULAR; INTRAVENOUS DAILY
Qty: 50 ML | Refills: 0 | OUTPATIENT
Start: 2017-01-01

## 2017-01-01 RX ORDER — AMINO ACIDS/PROTEIN HYDROLYS 15G-100/30
30 LIQUID (ML) ORAL 2 TIMES DAILY
Qty: 887 ML | Refills: 1 | Status: SHIPPED | OUTPATIENT
Start: 2017-01-01 | End: 2017-01-01

## 2017-01-01 RX ADMIN — LIDOCAINE HYDROCHLORIDE 1 G: 10 INJECTION, SOLUTION INFILTRATION; PERINEURAL at 21:02

## 2017-01-01 ASSESSMENT — PAIN SCALES - GENERAL
PAINLEVEL: NO PAIN (0)
PAINLEVEL: NO PAIN (0)

## 2017-01-01 ASSESSMENT — ENCOUNTER SYMPTOMS: FEVER: 0

## 2017-01-03 ENCOUNTER — CARE COORDINATION (OUTPATIENT)
Dept: GERIATRIC MEDICINE | Facility: CLINIC | Age: 82
End: 2017-01-03

## 2017-01-03 NOTE — PROGRESS NOTES
1-3-17  Received a copy of the review from Sultana Vance DAC from [facility] held on 12-14-16  See chart for details.  Dolores Bashir RN PHN  [facility]   [phone]

## 2017-01-04 DIAGNOSIS — B37.31 YEAST INFECTION OF THE VAGINA: Primary | ICD-10-CM

## 2017-01-04 RX ORDER — MICONAZOLE NITRATE 2 %
1 CREAM WITH APPLICATOR VAGINAL AT BEDTIME
Qty: 30 G | Refills: 0 | Status: SHIPPED | OUTPATIENT
Start: 2017-01-04 | End: 2017-01-11

## 2017-01-06 ENCOUNTER — MYC MEDICAL ADVICE (OUTPATIENT)
Dept: FAMILY MEDICINE | Facility: OTHER | Age: 82
End: 2017-01-06
Payer: COMMERCIAL

## 2017-01-06 PROCEDURE — 99207 ZZC NO BILLABLE SERVICE THIS VISIT: CPT | Performed by: FAMILY MEDICINE

## 2017-01-09 DIAGNOSIS — B37.31 YEAST INFECTION OF THE VAGINA: Primary | ICD-10-CM

## 2017-01-11 ENCOUNTER — RADIANT APPOINTMENT (OUTPATIENT)
Dept: GENERAL RADIOLOGY | Facility: OTHER | Age: 82
End: 2017-01-11
Attending: INTERNAL MEDICINE
Payer: COMMERCIAL

## 2017-01-11 ENCOUNTER — OFFICE VISIT (OUTPATIENT)
Dept: FAMILY MEDICINE | Facility: OTHER | Age: 82
End: 2017-01-11
Payer: COMMERCIAL

## 2017-01-11 ENCOUNTER — TELEPHONE (OUTPATIENT)
Dept: NURSING | Facility: CLINIC | Age: 82
End: 2017-01-11

## 2017-01-11 VITALS
HEART RATE: 72 BPM | SYSTOLIC BLOOD PRESSURE: 82 MMHG | TEMPERATURE: 96.6 F | OXYGEN SATURATION: 100 % | DIASTOLIC BLOOD PRESSURE: 58 MMHG

## 2017-01-11 DIAGNOSIS — L98.9 SKIN LESION: ICD-10-CM

## 2017-01-11 DIAGNOSIS — M25.432 WRIST SWELLING, LEFT: ICD-10-CM

## 2017-01-11 DIAGNOSIS — R41.0 DISORIENTATION: ICD-10-CM

## 2017-01-11 DIAGNOSIS — M25.432 WRIST SWELLING, LEFT: Primary | ICD-10-CM

## 2017-01-11 LAB
ANION GAP SERPL CALCULATED.3IONS-SCNC: 10 MMOL/L (ref 3–14)
BUN SERPL-MCNC: 19 MG/DL (ref 7–30)
CALCIUM SERPL-MCNC: 8.5 MG/DL (ref 8.5–10.1)
CHLORIDE SERPL-SCNC: 94 MMOL/L (ref 94–109)
CO2 SERPL-SCNC: 25 MMOL/L (ref 20–32)
CREAT SERPL-MCNC: 0.64 MG/DL (ref 0.52–1.04)
GFR SERPL CREATININE-BSD FRML MDRD: 88 ML/MIN/1.7M2
GLUCOSE SERPL-MCNC: 237 MG/DL (ref 70–99)
POTASSIUM SERPL-SCNC: 4.2 MMOL/L (ref 3.4–5.3)
SODIUM SERPL-SCNC: 129 MMOL/L (ref 133–144)

## 2017-01-11 PROCEDURE — 36415 COLL VENOUS BLD VENIPUNCTURE: CPT | Performed by: INTERNAL MEDICINE

## 2017-01-11 PROCEDURE — 99214 OFFICE O/P EST MOD 30 MIN: CPT | Performed by: INTERNAL MEDICINE

## 2017-01-11 PROCEDURE — 73110 X-RAY EXAM OF WRIST: CPT | Mod: LT

## 2017-01-11 PROCEDURE — 80048 BASIC METABOLIC PNL TOTAL CA: CPT | Performed by: INTERNAL MEDICINE

## 2017-01-11 RX ORDER — SILVER SULFADIAZINE 10 MG/G
CREAM TOPICAL 2 TIMES DAILY
Qty: 50 G | Refills: 1 | Status: SHIPPED | OUTPATIENT
Start: 2017-01-11 | End: 2017-01-01

## 2017-01-11 ASSESSMENT — PAIN SCALES - GENERAL: PAINLEVEL: SEVERE PAIN (6)

## 2017-01-11 NOTE — PROGRESS NOTES
Chief Complaint   Patient presents with     Wrist left     swollen     CHIEF COMPLAINT:    The patient is an 86-year-old female who lives at the [facility] home. She recently had a slight fall and landed on her left wrist. There is some swelling and a small amount of hematoma noted. X-rays are performed which demonstrate no acute fracture. Have recommended limited use of the arm. Splinting will be unnecessary as the patient will try to take the splint off. Her caregiver notes that she has also had some slight decrease in her mental status. She does have significant dementia but this is apparently outside of her ordinary. She has a history of previous hyponatremia as well as urinary tract infections which have caused similar symptoms.                         PAST, FAMILY,SOCIAL HISTORY:     Medical  History:   has a past medical history of Sprain of hand, unspecified site (05/05/95); Closed fracture of metatarsal bone(s) (09/15/94); Open wound of finger(s) , without mention of complication (06/13/94); Attention to dressings and sutures (06/20/94); Contact dermatitis and other eczema, due to unspecified cause (05/03/94); Type II or unspecified type diabetes mellitus without mention of complication, not stated as uncontrolled (11/18/93); Hypoglycemia, unspecified (10/11/93); Cellulitis and abscess of upper arm and forearm (11/02/92); Unspecified schizophrenia, unspecified condition (10/12/92); Other malaise and fatigue (9/14/2005); Hyposmolality and/or hyponatremia (08/23/2007); Depressive disorder; Unspecified cerebral artery occlusion with cerebral infarction; Pneumonia (12/30/2014); Sepsis (H) (12/30/2014); and History of skin cancer (1/15/2015).     Surgical History:   has past surgical history that includes REMV CATARACT EXTRACAP,INSERT LENS (02/20/2003); REMV CATARACT EXTRACAP,INSERT LENS (1/16/2003); colonoscopy (05/18/09); and Ankle surgery.     Social History:   reports that she has never smoked.  She has never used smokeless tobacco. She reports that she does not drink alcohol or use illicit drugs.     Family History:  family history includes Breast Cancer in her sister; CEREBROVASCULAR DISEASE in her brother; Cardiovascular in her mother; DIABETES in her mother; HEART DISEASE in her sister; Neurologic Disorder in her brother.            MEDICATIONS  Current Outpatient Prescriptions   Medication Sig Dispense Refill     silver sulfADIAZINE (SILVADENE) 1 % cream Apply topically 2 times daily 50 g 1     miconazole ([employer] MICONAZOLE 7) 2 % cream Place 1 applicator vaginally At Bedtime for 7 days 30 g 0     Alcohol Swabs (B-D SINGLE USE SWABS REGULAR) PADS FOR DIABETIC TESTING AS ORDERED 100 each 0     levothyroxine (SYNTHROID/LEVOTHROID) 50 MCG tablet TAKE 1 TABLET BY MOUTH ONCE DAILY 31 tablet 0     metFORMIN (GLUCOPHAGE) 850 MG tablet TAKE 1 CAPSULE BY MOUTH TWI CE A DAY WITH MEALS 62 tablet 0     amLODIPine (NORVASC) 5 MG tablet TAKE 1 TABLET BY MOUTH EVERY DAY *HIGH BLOOD PRESSURE* 31 tablet 3     lovastatin (MEVACOR) 20 MG tablet 1/2  TABLET DAILY at bedtime 45 tablet 1     blood glucose monitoring (ONE TOUCH ULTRA) test strip by In Vitro route daily 100 each prn     NITROSTAT 0.4 MG SL tablet DISSOLVE 1 TAB UNDER TONGUE EVERY 5 MINUTES AS NEEDED FOR CHEST PAINMAY REPEAT F OR UP TO 3 DOSES. 25 tablet PRN     acetaminophen (TYLENOL) 500 MG tablet Take 1,000 mg by mouth every 6 hours as needed for mild pain       PSEUDOEPHEDRINE HCL PO Take 2 tablets by mouth every 4 hours as needed       guaiFENesin (TUSSIN) 100 MG/5ML LIQD Take 10 mLs by mouth every 4 hours as needed for cough       Skin Protectants, Misc. (NO STING BARRIER FILM) MISC No Sting Barrier Film Spray Apply as directed 1 each 11     calcium-vitamin D (CALTRATE) 600-400 MG-UNIT per tablet TAKE 1 TABLET BY MOUTH EVERY DAY (CALCIUM DEFICIANCY) 31 tablet 11     Elastic Bandages & Supports (T.E.D. KNEE LENGTH/S-REGULAR) MISC 1 Package daily 1 each 2      Sennosides-Docusate Sodium (SENNA-DOCUSATE SODIUM) 8.6-50 MG TABS TAKE 1 TABLET BY MOUTH EVERY DAY AS NEEDED FOR CONSTIPATION 30 tablet 5     polyethylene glycol (MIRALAX) powder Take 17 g (1 capful) by mouth every other day 510 g 1     albuterol (2.5 MG/3ML) 0.083% nebulizer solution Take 1 vial (2.5 mg) by nebulization every 6 hours as needed for shortness of breath / dyspnea or wheezing 30 vial 0     ramipril (ALTACE) 10 MG capsule Take 1 capsule (10 mg) by mouth daily 62 capsule 3     cyanocobalamin (VITAMIN B12) 1000 MCG/ML injection Inject 1 mL into the muscle every 30 days       bismuth subsalicylate (PEPTO BISMOL) 262 MG/15ML suspension Take 10 mLs by mouth every 4 hours as needed for indigestion Two TBLS as needed       blood glucose monitoring (ONE TOUCH DELICA) lancets USE TO TEST BLOOD SUGAR EVERY DAY OR AS DIRECTED 1 Box 3     Cholecalciferol 400 UNITS CAPS Take 2 capsules by mouth daily 180 capsule 3     Cranberry (CRANBERRY CONCENTRATE) 500 MG CAPS Take 2 capsules (1,000 mg) by mouth 2 times daily 200 capsule 6     blood glucose calibration (ONETOUCH ULTRA CONTROL) solution USE TO CALIBRATE BLOOD GLUCOSE MONITOR WHEN STARTING A NEW VIAL OFSTRIPS 1 each 3     loperamide (IMODIUM A-D) 2 MG tablet Start with 2 tabs (4 mg), then take one tab (2 mg) after each diarrheal stool.  Do not use more than  8 tabs (16 mg) per day. 30 tablet 0     ORDER FOR DME Equipment being ordered: 2 x 2 allevyn gentle foam dressing  To be used twice daily to open area 4 Package 6     timolol (TIMOPTIC) 0.5 % ophthalmic solution Place 1 drop into both eyes daily        Skin Protectants, Misc. (EUCERIN) cream Use daily at bedtime on feet and twice daily on tailbone 454 g 0     INCONTINENCE BRIEF MEDIUM MISC use at bedtime for incontinence 1 case prn         --------------------------------------------------------------------------------------------------------------------                          REVIEW OF SYSTEMS:        Is as  noted above per her care provider                            EXAMINATION:        Vital Signs:  B/P: 82/58, T: 96.6, P: 72, R: Data Unavailable, BMI: There is no weight on file to calculate BMI.   Constitutional: The patient appears to be in no acute distress. The patient appears to be adequately hydrated. No acute respiratory or hemodynamic distress is noted at this time.   LUNGS: clear bilaterally, airflow is brisk, no intercostal retraction or stridor is noted. No coughing is noted during visit.   HEART:  regular without rubs, clicks, gallops. PMI is nondisplaced. Upstrokes are brisk. S1,S2 are heard. Systolic murmur consistent with her known mitral regurgitation is noted.   PSYCH: The patient appears grossly unaware. Maintains poor eye contact, does not have any jittery or atypical motion. Displays detached affect. His non-conversational and simply stares off into space.   MS: Minimal crepitance is noted in the extremities. No deformity is present. Muscle strength is appropriate and equal bilaterally. No acute joint erythema or swelling is present.  No deformity or angulation of the wrist is noted. There is significant swelling with modest ecchymosis. No distal neurovascular involvement is seen.   SKIN:  warm and dry. No erythema, or rashes are noted. A small slightly cornified/non-erythematous lesion on her upper right tibia is noted.                        DECISION MAKING:       #1 swollen left wrist   X-ray performed and unremarkable  await official reading   Elevate as needed  #2 increased disorientation   Check electrolytes and urinalysis  #3 small skin lesion on anterior left upper tibia   Patient already has a dermatology appointment for this week.                           FOLLOW UP    I have asked the patient to make an appointment for follow up with her primary care provider as needed    I have carefully explained the diagnosis and treatment options with the patient. The patient has displayed an  understanding of the above, and all subsequent questions were answered.       DO OSIRIS Alicia    Portions of this note were produced using [employer]  Although every attempt at real-time proof reading has been made, occasional grammar/syntax errors may have been missed.

## 2017-01-11 NOTE — NURSING NOTE
Chief Complaint   Patient presents with     Wrist left     swollen       Initial BP 82/58 mmHg  Pulse 72  Temp(Src) 96.6  F (35.9  C) (Temporal)  Ht   Wt   SpO2 100% Estimated body mass index is 23.90 kg/(m^2) as calculated from the following:    Height as of 11/29/16: 5' (1.524 m).    Weight as of 11/29/16: 122 lb 6.4 oz (55.52 kg).  BP completed using cuff size: dakota CARPIO

## 2017-01-12 ENCOUNTER — OFFICE VISIT (OUTPATIENT)
Dept: FAMILY MEDICINE | Facility: OTHER | Age: 82
End: 2017-01-12
Payer: COMMERCIAL

## 2017-01-12 VITALS
TEMPERATURE: 97.9 F | SYSTOLIC BLOOD PRESSURE: 110 MMHG | DIASTOLIC BLOOD PRESSURE: 56 MMHG | HEART RATE: 84 BPM | RESPIRATION RATE: 20 BRPM

## 2017-01-12 DIAGNOSIS — B35.4 TINEA CORPORIS: Primary | ICD-10-CM

## 2017-01-12 PROCEDURE — 99212 OFFICE O/P EST SF 10 MIN: CPT | Performed by: FAMILY MEDICINE

## 2017-01-12 RX ORDER — CLOTRIMAZOLE AND BETAMETHASONE DIPROPIONATE 10; .64 MG/G; MG/G
CREAM TOPICAL 2 TIMES DAILY
Qty: 60 G | Refills: 1 | Status: SHIPPED | OUTPATIENT
Start: 2017-01-12 | End: 2017-01-01

## 2017-01-12 ASSESSMENT — PAIN SCALES - GENERAL: PAINLEVEL: NO PAIN (0)

## 2017-01-12 NOTE — PROGRESS NOTES
SUBJECTIVE:                                                    Maggi Evans is a 86 year old female who presents to clinic today for the following health issues:      Rash      Duration: 2 weeks    Description  Location: btw thighs and on back  Itching: mild?    Intensity: none    Accompanying signs and symptoms: thigh area somewhat raw    History (similar episodes/previous evaluation): None    Precipitating or alleviating factors:  New exposures:  None  Recent travel: no      Therapies tried and outcome: moisture barrier cream and ecucerin cream         Rash on left back and upper inner thighs  /56 mmHg  Pulse 84  Temp(Src) 97.9  F (36.6  C) (Temporal)  Resp 20  Ht   Wt     Appears to be and intriginous/tinea like rash, does not appear secondarily infected.  Was treated for a yeast vaginitis yesterday    IMP: intriginous rash with a a fungal component  PL: lotrasone cr, attention to positioning on back     dbue

## 2017-01-12 NOTE — TELEPHONE ENCOUNTER
Call Type: Triage Call    Presenting Problem: Yue,  from  [facility], calling to report three  medication errors.,  Calcium, Metformin, and UTI Sat Liquid were due  at 5:00pm and were not given untill7:49.  Triage Note:  Guideline Title: Information Only Call; No Symptom Triage (Adult)  Recommended Disposition: Provide Information or Advice Only  Original Inclination: Wanted to speak with a nurse  Override Disposition:  Intended Action: Follow advice given  Physician Contacted: No  Health information question; person denies any symptoms, no triage required.  Information provided from approved references or clinical experience. ?  YES  Requesting regular office appointment ? NO  Sign(s) or symptom(s) associated with a diagnosed condition or with a new illness  ? NO  Requesting information about provider, services or community resources ? NO  Call back to complete assessment/clarification of information from prior caller to  complete triage ? NO  Requesting information and provider is best resource; no triage required. ? NO  Caller not with patient and is unable to provide clinical information about  patient to facilitate triage. ? NO  Requesting provider information for recently scheduled test, procedure; no triage  required. Needed information not available per approved resources or clinical  experience. ? NO  Requesting information not available per approved reference or clinical  experience; no triage required. ? NO  Requesting information regarding scheduled exam, test or procedure; no triage  required. Information provided from approved resources or clinical experience. ?  NO  Physician Instructions:  Care Advice:

## 2017-01-12 NOTE — MR AVS SNAPSHOT
After Visit Summary   1/12/2017    Maggi Evans    MRN: [id number]           Patient Information     Date Of Birth          5/31/1930        Visit Information        Provider Department      1/12/2017 1:00 PM Kyrie Eugene MD [facility]         Follow-ups after your visit        Future tests that were ordered for you today     Open Future Orders        Priority Expected Expires Ordered    *UA reflex to Microscopic and Culture ([facility], [place] and [facility] (except [place] and [place]) Routine  1/11/2018 1/11/2017            Who to contact     If you have questions or need follow up information about today's clinic visit or your schedule please contact [facility] directly at [phone].  Normal or non-critical lab and imaging results will be communicated to you by [email or web address], letter or phone within 4 business days after the clinic has received the results. If you do not hear from us within 7 days, please contact the clinic through [email or web address] or phone. If you have a critical or abnormal lab result, we will notify you by phone as soon as possible.  Submit refill requests through [employer] or call your pharmacy and they will forward the refill request to us. Please allow 3 business days for your refill to be completed.          Additional Information About Your Visit        MyChart Information     [employer] gives you secure access to your electronic health record. If you see a primary care provider, you can also send messages to your care team and make appointments. If you have questions, please call your primary care clinic.  If you do not have a primary care provider, please call [phone] and they will assist you.        Care EveryWhere ID     This is your Care EveryWhere ID. This could be used by other organizations to access your [place] medical records  [id number]        Your Vitals Were     Pulse Temperature Respirations             84 97.9  F (36.6  C)  (Temporal) 20          Blood Pressure from Last 3 Encounters:   01/12/17 110/56   01/11/17 82/58   11/29/16 114/60    Weight from Last 3 Encounters:   11/29/16 122 lb 6.4 oz (55.52 kg)   08/04/16 124 lb 1.6 oz (56.291 kg)   06/26/16 125 lb (56.7 kg)              Today, you had the following     No orders found for display       Primary Care Provider Office Phone # Fax #    Davidson Durbin [phone]       [facility] [address] [facility] [phone]        Thank you!     Thank you for choosing [facility]  for your care. Our goal is always to provide you with excellent care. Hearing back from our patients is one way we can continue to improve our services. Please take a few minutes to complete the written survey that you may receive in the mail after your visit with us. Thank you!             Your Updated Medication List - Protect others around you: Learn how to safely use, store and throw away your medicines at www.disposemymeds.org.          This list is accurate as of: 1/12/17  1:15 PM.  Always use your most recent med list.                   Brand Name Dispense Instructions for use    acetaminophen 500 MG tablet    TYLENOL     Take 1,000 mg by mouth every 6 hours as needed for mild pain       albuterol (2.5 MG/3ML) 0.083% neb solution     30 vial    Take 1 vial (2.5 mg) by nebulization every 6 hours as needed for shortness of breath / dyspnea or wheezing       amLODIPine 5 MG tablet    NORVASC    31 tablet    TAKE 1 TABLET BY MOUTH EVERY DAY *HIGH BLOOD PRESSURE*       B-D SINGLE USE SWABS REGULAR Pads     100 each    FOR DIABETIC TESTING AS ORDERED       bismuth subsalicylate 262 MG/15ML suspension    PEPTO BISMOL     Take 10 mLs by mouth every 4 hours as needed for indigestion Two TBLS as needed       blood glucose calibration solution     1 each    USE TO CALIBRATE BLOOD GLUCOSE MONITOR WHEN STARTING A NEW VIAL OFSTRIPS       blood glucose monitoring  lancets     1 Box    USE TO TEST BLOOD SUGAR EVERY DAY OR AS DIRECTED       blood glucose monitoring test strip    ONE TOUCH ULTRA    100 each    by In Vitro route daily       calcium-vitamin D 600-400 MG-UNIT per tablet    CALTRATE    31 tablet    TAKE 1 TABLET BY MOUTH EVERY DAY (CALCIUM DEFICIANCY)       Cholecalciferol 400 UNITS Caps     180 capsule    Take 2 capsules by mouth daily       Cranberry 500 MG Caps    CRANBERRY CONCENTRATE    200 capsule    Take 2 capsules (1,000 mg) by mouth 2 times daily       cyanocobalamin 1000 MCG/ML injection    VITAMIN B12     Inject 1 mL into the muscle every 30 days       * eucerin cream     454 g    Use daily at bedtime on feet and twice daily on tailbone       * NO STING BARRIER FILM Misc     1 each    No Sting Barrier Film Spray Apply as directed       Incontinence Brief Medium Misc     1 case    use at bedtime for incontinence       levothyroxine 50 MCG tablet    SYNTHROID/LEVOTHROID    31 tablet    TAKE 1 TABLET BY MOUTH ONCE DAILY       loperamide 2 MG tablet    IMODIUM A-D    30 tablet    Start with 2 tabs (4 mg), then take one tab (2 mg) after each diarrheal stool.  Do not use more than  8 tabs (16 mg) per day.       lovastatin 20 MG tablet    MEVACOR    45 tablet    1/2  TABLET DAILY at bedtime       metFORMIN 850 MG tablet    GLUCOPHAGE    62 tablet    TAKE 1 CAPSULE BY MOUTH TWI CE A DAY WITH MEALS       NITROSTAT 0.4 MG sublingual tablet   Generic drug:  nitroglycerin     25 tablet    DISSOLVE 1 TAB UNDER TONGUE EVERY 5 MINUTES AS NEEDED FOR CHEST PAINMAY REPEAT F OR UP TO 3 DOSES.       order for [facility]     4 Package    Equipment being ordered: 2 x 2 allevyn gentle foam dressing To be used twice daily to open area       polyethylene glycol powder    MIRALAX    510 g    Take 17 g (1 capful) by mouth every other day       PSEUDOEPHEDRINE HCL PO      Take 2 tablets by mouth every 4 hours as needed       ramipril 10 MG capsule    ALTACE    62 capsule    Take 1 capsule  (10 mg) by mouth daily       SENNA-docusate sodium 8.6-50 MG Tabs     30 tablet    TAKE 1 TABLET BY MOUTH EVERY DAY AS NEEDED FOR CONSTIPATION       silver sulfADIAZINE 1 % cream    SILVADENE    50 g    Apply topically 2 times daily       T.E.D. KNEE LENGTH/S-REGULAR Misc     1 each    1 Package daily       timolol 0.5 % ophthalmic solution    TIMOPTIC     Place 1 drop into both eyes daily       TUSSIN 100 MG/5ML Liqd   Generic drug:  guaiFENesin      Take 10 mLs by mouth every 4 hours as needed for cough       * Notice:  This list has 2 medication(s) that are the same as other medications prescribed for you. Read the directions carefully, and ask your doctor or other care provider to review them with you.

## 2017-01-12 NOTE — NURSING NOTE
Chief Complaint   Patient presents with     Derm Problem     x's 2 weeks btw thighs and on back       Initial /56 mmHg  Pulse 84  Temp(Src) 97.9  F (36.6  C) (Temporal)  Resp 20  Ht   Wt  Estimated body mass index is 23.90 kg/(m^2) as calculated from the following:    Height as of 11/29/16: 5' (1.524 m).    Weight as of 11/29/16: 122 lb 6.4 oz (55.52 kg).  BP completed using cuff size: regular  Allie Patten LPN

## 2017-01-13 DIAGNOSIS — R82.90 NONSPECIFIC FINDING ON EXAMINATION OF URINE: Primary | ICD-10-CM

## 2017-01-13 DIAGNOSIS — R41.0 DISORIENTATION: ICD-10-CM

## 2017-01-13 LAB
ALBUMIN UR-MCNC: NEGATIVE MG/DL
APPEARANCE UR: ABNORMAL
BACTERIA #/AREA URNS HPF: ABNORMAL /HPF
BILIRUB UR QL STRIP: NEGATIVE
COLOR UR AUTO: YELLOW
GLUCOSE UR STRIP-MCNC: NEGATIVE MG/DL
HGB UR QL STRIP: ABNORMAL
KETONES UR STRIP-MCNC: NEGATIVE MG/DL
LEUKOCYTE ESTERASE UR QL STRIP: ABNORMAL
NITRATE UR QL: NEGATIVE
NON-SQ EPI CELLS #/AREA URNS LPF: ABNORMAL /LPF
PH UR STRIP: 7 PH (ref 5–7)
RBC #/AREA URNS AUTO: ABNORMAL /HPF (ref 0–2)
SP GR UR STRIP: 1.01 (ref 1–1.03)
URN SPEC COLLECT METH UR: ABNORMAL
UROBILINOGEN UR STRIP-ACNC: 0.2 EU/DL (ref 0.2–1)
WBC #/AREA URNS AUTO: >100 /HPF (ref 0–2)

## 2017-01-13 PROCEDURE — 87088 URINE BACTERIA CULTURE: CPT | Performed by: INTERNAL MEDICINE

## 2017-01-13 PROCEDURE — 81001 URINALYSIS AUTO W/SCOPE: CPT | Performed by: INTERNAL MEDICINE

## 2017-01-13 PROCEDURE — 87086 URINE CULTURE/COLONY COUNT: CPT | Performed by: INTERNAL MEDICINE

## 2017-01-13 PROCEDURE — 87186 SC STD MICRODIL/AGAR DIL: CPT | Performed by: INTERNAL MEDICINE

## 2017-01-18 NOTE — PATIENT INSTRUCTIONS
Recommendations from today's MTM visit:                                                        1.  MD may consider reduction in Ramipril dose to 5mg daily and follow-up blood pressure as well as basic metabolic panel in clinic in 2 weeks from change.  Please continue checking BPs at home, and recommend bringing BP monitor into next clinic visit to calibrate.  Could increase Amlodipine in future if BPs increase above goal <150/90mmHg with potential reduction in Ramipril dose.    2.  D'c prn Pepto Bismol - may increase risk of bleeding.  I will send order to you.    3.  MD may consider switch from Metformin 850mg bid to Metformin ER 1000mg once daily with breakfast.  Continue to monitor blood sugars and for improvement in abdominal pain/diarrhea.  If necessary, could increase dose further in future.    Next MTM visit: 4 weeks over the phone, sooner if necessary    To schedule another MTM appointment, please call me directly or you may call the MTM scheduling line at [phone] or toll-free at [phone].     My Clinical Pharmacist's contact information:                                                      It was a pleasure talking to you today!  Please feel free to contact me with any questions or concerns you have.      Shannan Shannon, Pharm.D.,CGP  Board Certified Geriatric Pharmacist  Medication Therapy Management Pharmacist  [phone]    You may receive a survey about the MTM services you received.  I would appreciate your feedback to help me serve you better in the future. Please fill it out and return it when you can. Your comments will be anonymous.

## 2017-01-18 NOTE — Clinical Note
Jacqui Nash, Please see MTM visit from today & recommendation re Ramipril and Metformin.  Please let me know if you are ok with changes, and I can communicate to caregiver and send orders to pharmacy and group home.  Thank you!

## 2017-01-18 NOTE — TELEPHONE ENCOUNTER
Metformin          Last Written Prescription Date: 12/19/2016  Last Fill Quantity: 62, # refills: 0  Last Office Visit with [facility], [facility] or  Health prescribing provider:  1/12/2017        BP Readings from Last 3 Encounters:   01/12/17 110/56   01/11/17 82/58   11/29/16 114/60     MICROL       49   12/3/2014  No results found for this basename: microalbumin  CREATININE   Date Value Ref Range Status   01/11/2017 0.64 0.52 - 1.04 mg/dL Final   ]  GFR ESTIMATE   Date Value Ref Range Status   01/11/2017 88 >60 mL/min/1.7m2 Final     Comment:     Non  GFR Calc   12/30/2016 >90  Non  GFR Calc   >60 mL/min/1.7m2 Final   08/25/2016 84 >60 mL/min/1.7m2 Final     Comment:     Non  GFR Calc     GFR ESTIMATE IF BLACK   Date Value Ref Range Status   01/11/2017 >90   GFR Calc   >60 mL/min/1.7m2 Final   12/30/2016 >90   GFR Calc   >60 mL/min/1.7m2 Final   08/25/2016 >90   GFR Calc   >60 mL/min/1.7m2 Final     CHOL      123   8/25/2016  HDL       52   8/25/2016  LDL       54   8/25/2016  LDL       35   8/30/2011  TRIG       83   8/25/2016  CHOLHDLRATIO      1.4   12/30/2014  AST       16   4/5/2016  ALT       23   4/5/2016  A1C      6.6   11/29/2016  A1C      5.9   5/23/2016  A1C      5.9   5/23/2016  A1C      5.9   11/5/2015  A1C      5.9   11/5/2015  A1C      6.4   5/4/2015  A1C      6.2   11/3/2014  POTASSIUM   Date Value Ref Range Status   01/11/2017 4.2 3.4 - 5.3 mmol/L Final     Levothyroxine      Last Written Prescription Date: 12/19/2016  Last Quantity: 31, # refills: 0  Last Office Visit with [facility], [facility] or [facility] prescribing provider: 1/12/2017        TSH   Date Value Ref Range Status   11/05/2015 1.31 mcU/mL Final

## 2017-01-18 NOTE — PROGRESS NOTES
"SUBJECTIVE/OBJECTIVE:                                                    Maggi Evans is an 86 year old female called for a follow-up visit for Medication Therapy Management.  She was referred to me from [facility].  I spoke with Maggi's caregiver, Ludivina, instead of patient due to Maggi's cognitive impairment.  Received permission previously to do so.  Maggi resides in a group home.    Chief Complaint: Follow up from our visit on 8/16/16.    Tobacco: No tobacco use   Alcohol: not currently using  ambulates with a walker in the home; w/c outside of home. Has had a few falls over the last year.    Medication Adherence: has assistance setting up med boxes    Hypertension/CAD: Current medications include Amlodipine 5mg qpm, Ramipril 10mg qam, prn NTG.  ASA 81mg qd was dc'd at hospitalization in April -  Per hospital discharge summary on 4/6/16: \"Because of this heavy bleeding and concern for risk of rebleeding, she was advised to discontinue aspirin therapy for 1-2 weeks at discharge.  She was hemodynamically stable although blood pressure appeared to be lower than her usual baseline probably due to acute blood losses.  For this reason, her dose of ramipril was decreased at discharge.\"   Since then, Hgb is back up, but when checked with PCP following last visit, he prefers her to stay off the ASA.  Uses TEDS stockings; edema well controlled currently per caregiver.  Has not needed prn NTG tabs. As noted above, has had a few falls over last year; fall in April resulting in hospitalization.  Did have another fall about 2-3 weeks ago; hurt her wrist.  Caregiver has been monitoring BPs and HRs at home twice daily: ranges 132-159/67-92mmHg; typically SBPs when checked at home are around 135.  HR 77-99.   Denies dizziness, lightheadedness, chest pain.  Of note, more recent BPs when checked in clinic have been quite low; see below.  Caregiver states they have been salting her food due to low Na.    Diabetes:  " Pt currently taking Metformin.  Pt is experiencing the following side effects: occasional reports of stomach pain; seem related to either diarrhea or constipation  SMBG: one time daily; alternates times (before bkfst, lunch, or dinner).   Ranges (based on glucometer readings): prior to bkfst: 130-153mg/dL, prior to lunch: 152-187mg/dL, prior to dinner: 112-136mg/dL  Symptoms of low blood sugar? none. Frequency of hypoglycemia? Never  Recent symptoms of high blood sugar? none  Eye exam: up to date - in May/June 2016  Foot exam: up to date  Microalbumin is not < 30 mg/g. Pt is taking an ACEi/ARB.  Aspirin: no longer on due to h/o JAIMEE.  Diet/Exercise: eats 3 meals daily, 3 snacks - healthy choices.  She does have PT exercises that staff is helping with once daily.  Pt will occasionally state she is too tired to participate in these.      Hypothyroidism: Patient is taking levothyroxine 50 mcg daily. Patient is having the following symptoms: none.    UTIs:   Takes Cranberry tabs as well as UTI-Stat for prevention.  Had another urethra dilatation on 12/22/16 following UTIs.   Was on Bactrim for prevention in the past, but this was dc'd due to risk of drug interactions, resistance, safety, etc.  Caregiver reports no signs/sx of UTI currently.    Schizoaffective/dementia:  Diagnosis of this on her problem list.  No longer on Risperidone; tolerated d'c.  Had hoped Na would improve following d'c, but no change.   Azeb reports she has been more interactive, mood stable, no reports of being of harm to self or others.    Pain:  Prn Tylenol available.  No pain complaints recently.  H/o hip/leg pain noted in past.     Anemia:  Current medication includes Vit B12 monthly.  No longer on iron and vit C supp, and Hgb has remained stable.  Denies reports of SOB.  Does not notice more fatigue.  As mentioned above, occasionally pt will not want to participate in PT due to being tired.     Constipation: Has prn Senna, prn Loperamide  available for use in addition to scheduled Miralax every 3 days; diarrhea was a problem with more frequent use.  History of constipation alternating with diarrhea.  No recent complaints of either.       Supplements:  Currently taking calcium/vit D qd along with plain vit D.  Denies complaints of heartburn/reflux.    GERD: Current medications include:  prn Pepto Bismol. Caregiver states that Pepto is not used.    Hyperlipidemia: Current therapy includes Lovastatin 10mg once daily.  Dose has been reduced and tolerated; LDL reported below.  Denies complaints of muscle pain/weakness.    SIADH:  H/o low Na.  Previous caregiver had noted that pt had problems with low Na prior to starting Ramipril.  As noted above, d'c of Risperidone has not improved Na level.  Caregiver reports salting food now and Gatorade to try and help with Na level, but feels nothing has helped.    Allergies:  Current medication includes prn Tussin, prn Sudafed.  Caregiver denies current symptoms & states infrequent use of either of these meds.  Would like to keep them on hand in case they are needed.    Current labs include:  BP Readings from Last 3 Encounters:   01/12/17 110/56   01/11/17 82/58   11/29/16 114/60       A1C      6.6   11/29/2016.  CHOL      123   8/25/2016  TRIG       83   8/25/2016  HDL       52   8/25/2016  LDL       54   8/25/2016  LDL       35   8/30/2011    Lab Results   Component Value Date    UCRR 29 12/03/2014    MICROL 49 12/03/2014    UMALCR 100 05/23/2016       Last Basic Metabolic Panel:  NA      129   1/11/2017   POTASSIUM      4.2   1/11/2017  CHLORIDE       94   1/11/2017  BUN       19   1/11/2017  CR     0.64   1/11/2017  GFR ESTIMATE   Date Value Ref Range Status   01/11/2017 88 >60 mL/min/1.7m2 Final     Comment:     Non  GFR Calc   12/30/2016 >90  Non  GFR Calc   >60 mL/min/1.7m2 Final   08/25/2016 84 >60 mL/min/1.7m2 Final     Comment:     Non  GFR Calc     GFR  ESTIMATE IF BLACK   Date Value Ref Range Status   01/11/2017 >90   GFR Calc   >60 mL/min/1.7m2 Final   12/30/2016 >90   GFR Calc   >60 mL/min/1.7m2 Final   08/25/2016 >90   GFR Calc   >60 mL/min/1.7m2 Final       TSH   Date Value Ref Range Status   11/05/2015 1.31 mcU/mL Final   ]    There were no vitals taken for this visit.    Most Recent Immunizations   Administered Date(s) Administered     Hepatitis B 09/18/2006     Influenza (H1N1) 01/11/2010     Influenza (High Dose) 3 valent vaccine 09/25/2015     Influenza (IIV3) 10/08/2010     Influenza Vaccine IM 3yrs+ 4 Valent IIV4 10/01/2014     Mantoux 10/11/2011     Pneumococcal (PCV 13) 11/23/2015     Pneumococcal 23 valent 10/17/2005     TD (ADULT, 7+) 07/20/2003     TDAP (ADACEL AGES 11-64) 10/29/2012         Current medications were reviewed with her today.      Medication Adherence: no issues identified    Hypertension/CAD:  Patient is meeting BP goal of < 150/90mmHg.  This is an appropriate BP goal given her risk for falling & her dementia.   May benefit from reduction in Ramipril dose to see if this improves hyponatremia; BPs when last checked in clinic were quite low, and do not want too tight control due to her increased risk of falls, hypotension, tissue/cerebral hypoperfusion.  Caregiver's BP monitor likely reading higher than clinic and may benefit from bringing monitor into next clinic visit to compare/calibrate.    Diabetes: Patient is meeting HgA1c goal <8%, and self monitoring of blood glucose is at goal of <200mg/dL.  HgA1 goal <8% is appropriate goal in this elderly pt with dementia & at risk for side effects & falls.  May be of benefit to consider switch to Metformin ER to allow once daily dosing and reduce risk of side effects since occasional stomach pain/diarrhea may be due to Metformin.     Hypothyroidism: Stable. Last TSH is within normal limits.     UTIs:  Stable.  It is appropriate that pt is no  longer on Bactrim due to the likelihood that it can contribute to low Na, was no longer offering benefit, and can contribute to g.i. Upset, as well as increased risk of drug interactions (such as with the ramipril: increased risk of hyperkalemia).     Schizoaffective/dementia:  Stable.    Pain:  Stable.  In future, if pt has increased agitation, may benefit from scheduled use of Tylenol vs prn.  Likely difficult for her to communicate when she is uncomfortable, and pain may contribute to mood.  Studies have shown that scheduled use of Tylenol has allowed reduction in use of pscyhotropics.    Anemia:   Stable.    Constipation:  Stable.      Supplements:  Stable.    GERD: Stable. May benefit from d'c of prn Pepto Bismol due to increased risk of bleed associated with use of this med.    Hyperlipidemia: Stable.     SIADH:  Most recent Na level continues to be low.  As noted above, may be of benefit to try reduction in Ramipril & monitor BMP for improvement in Na level.  If no improvement noted, could consider addition of NaCl supplement.    Allergies:  Stable.         PLAN:                                                      1.  MD may consider reduction in Ramipril dose to 5mg daily and follow-up BP as well as BMP in clinic in 2 weeks from change.  Caregiver to continue checking BPs at home, and recommend bringing BP monitor into next clinic visit to calibrate.  Could increase Amlodipine in future if BPs increase above goal <150/90mmHg with potential reduction in Ramipril dose.  2.  D'c prn Pepto Bismol - may increase risk of bleeding.  I will send order to caregiver.  3.  MD may consider switch from Metformin 850mg bid to Metformin ER 1000mg once daily with breakfast.  Continue to monitor blood sugars and for improvement in abdominal pain/diarrhea.  If necessary, could increase dose further in future.    I spent 30 minutes with this patient's caregiver today.  A copy of the visit note was provided to the patient's  primary care provider.     Will follow up in 4 weeks, sooner if necessary.    The patient's caregiver was e-mailed a summary of these recommendations as an after visit summary as requested.    Shannan Shannon, Pharm.D.,CGP  Board Certified Geriatric Pharmacist  Medication Therapy Management Pharmacist  [phone]

## 2017-01-19 NOTE — TELEPHONE ENCOUNTER
Routing refill request to provider for review/approval because:  Drug not on the [facility] refill protocol (silvadene)  Diagnosis not on protocol for Vitamin D    Lenore Villavicencio RN  [facility]

## 2017-01-19 NOTE — TELEPHONE ENCOUNTER
Ramipril 10 MG      Last Written Prescription Date: 04/06/2016  Last Fill Quantity: 62, # refills: 3  Last Office Visit with G, P or [facility] prescribing provider: 01/12/2017       POTASSIUM   Date Value Ref Range Status   01/11/2017 4.2 3.4 - 5.3 mmol/L Final     CREATININE   Date Value Ref Range Status   01/11/2017 0.64 0.52 - 1.04 mg/dL Final     BP Readings from Last 3 Encounters:   01/12/17 110/56   01/11/17 82/58   11/29/16 114/60

## 2017-01-19 NOTE — PROGRESS NOTES
Quick Note:    Dear Maggi, your recent test results are attached.   Your urine culture demonstrated E. Coli.  You should be started on antibiotics.  I have sent a prescription to your local pharmacy.  You should recheck your urine when you're done with the antibiotic.  Feel free to contact me via the office or My Chart if you have any questions regarding the above.    Sincerely,  Kyle Blake,  FACOI    ______

## 2017-01-19 NOTE — PROGRESS NOTES
Quick Note:    Dear Maggi, your recent test results are attached.   X-ray of your wrist demonstrates some thinning of the bones and some soft tissue swelling but no fracture.    Feel free to contact me via the office or My Chart if you have any questions regarding the above.    Sincerely,  Kyle Blake, DO FACOI    ______

## 2017-01-19 NOTE — TELEPHONE ENCOUNTER
Vitamin D      Last Written Prescription Date: 1/27/16  Last Fill Quantity: 180,  # refills: 3   Last Office Visit with [facility], [facility] or [facility] prescribing provider: 1/12/17                                             Silvadene      Last Written Prescription Date: 1/11/17  Last Fill Quantity: 50 g,  # refills: 1   Last Office Visit with [facility], [facility] or [facility] prescribing provider: 1/12/17

## 2017-01-19 NOTE — TELEPHONE ENCOUNTER
Routing refill request to provider for review/approval because:  Labs out of range:  Microalbumin  Labs not current:  TSH    Lenore Villavicencio, RN  [facility]

## 2017-01-19 NOTE — TELEPHONE ENCOUNTER
Relayed Dr. Fu message. Pt will get the rx right away and will have urine rechecked.   Veronica Schmidt, [facility]

## 2017-01-19 NOTE — PROGRESS NOTES
Quick Note:    Dear Maggi, your recent test results are attached.   Your blood sugar was somewhat elevated at 237. Kidney function is good.    Feel free to contact me via the office or My Chart if you have any questions regarding the above.    Sincerely,  DO OSIRIS Alicia    ______

## 2017-01-20 NOTE — TELEPHONE ENCOUNTER
"Per fax from pharmacy, 2nd request \"Can we get a current order? Home says it should be 1 cap daily, thanks\"    "

## 2017-01-20 NOTE — TELEPHONE ENCOUNTER
Routing refill request to provider for review/approval because:  Medication is reported/historical  Masha Navarrete RN

## 2017-01-26 NOTE — MR AVS SNAPSHOT
After Visit Summary   1/26/2017    Maggi Evans    MRN: [id number]           Patient Information     Date Of Birth          5/31/1930        Visit Information        Provider Department      1/26/2017 10:40 AM Gia Khoury APRN CNP [facility]        Today's Diagnoses     Hypertension goal BP (blood pressure) < 140/90    -  1       Care Instructions    Okay to DC nitro, Pepto bismol and imodium     Decrease Altace to 5 mg daily    Bring back urine sample tomorrow.     Recheck sodium in 2 weeks after the medication change.                 Follow-ups after your visit        Future tests that were ordered for you today     Open Future Orders        Priority Expected Expires Ordered    Basic metabolic panel  (Ca, Cl, CO2, Creat, Gluc, K, Na, BUN) Routine 2/9/2017 1/26/2018 1/26/2017            Who to contact     If you have questions or need follow up information about today's clinic visit or your schedule please contact [facility] directly at [phone].  Normal or non-critical lab and imaging results will be communicated to you by CREATIVhart, letter or phone within 4 business days after the clinic has received the results. If you do not hear from us within 7 days, please contact the clinic through [employer] or phone. If you have a critical or abnormal lab result, we will notify you by phone as soon as possible.  Submit refill requests through [employer] or call your pharmacy and they will forward the refill request to us. Please allow 3 business days for your refill to be completed.          Additional Information About Your Visit        CREATIVhart Information     [employer] gives you secure access to your electronic health record. If you see a primary care provider, you can also send messages to your care team and make appointments. If you have questions, please call your primary care clinic.  If you do not have a primary care provider, please call [phone] and they will assist  you.        Care EveryWhere ID     This is your Care EveryWhere ID. This could be used by other organizations to access your [place] medical records  [id number]        Your Vitals Were     Pulse Temperature Respirations Pulse Oximetry Breastfeeding?       77 95.4  F (35.2  C) (Tympanic) 20 98% No        Blood Pressure from Last 3 Encounters:   01/26/17 108/58   01/12/17 110/56   01/11/17 82/58    Weight from Last 3 Encounters:   01/26/17 123 lb (55.792 kg)   11/29/16 122 lb 6.4 oz (55.52 kg)   08/04/16 124 lb 1.6 oz (56.291 kg)                 Today's Medication Changes          These changes are accurate as of: 1/26/17 11:34 AM.  If you have any questions, ask your nurse or doctor.               These medicines have changed or have updated prescriptions.        Dose/Directions    lovastatin 10 MG tablet   Commonly known as:  MEVACOR   This may have changed:  Another medication with the same name was removed. Continue taking this medication, and follow the directions you see here.   Changed by:  Gia Khoury APRN CNP        TAKE 1 TAB BY MOUTH AT BEDTIME   Refills:  0       ramipril 5 MG capsule   Commonly known as:  ALTACE   This may have changed:    - medication strength  - how much to take   Used for:  Hypertension goal BP (blood pressure) < 140/90   Changed by:  Gia Khoury APRN CNP        Dose:  5 mg   Take 1 capsule (5 mg) by mouth daily   Quantity:  30 capsule   Refills:  3         Stop taking these medicines if you haven't already. Please contact your care team if you have questions.     loperamide 2 MG tablet   Commonly known as:  IMODIUM A-D   Stopped by:  Gia Khoury APRN CNP                Where to get your medicines      These medications were sent to [id number] [place]  [address] [place] [address]  [address] [place] [id number]    Hours:  M-F 8:30-6:30; Sat 9-4; closed Sunday Phone:  [phone]    - ramipril 5 MG capsule             Primary Care Provider Office Phone #  Fax #    JORDY Lawson [phone]       [facility] [address] [facility] [address]        Thank you!     Thank you for choosing [facility]  for your care. Our goal is always to provide you with excellent care. Hearing back from our patients is one way we can continue to improve our services. Please take a few minutes to complete the written survey that you may receive in the mail after your visit with us. Thank you!             Your Updated Medication List - Protect others around you: Learn how to safely use, store and throw away your medicines at www.disposemymeds.org.          This list is accurate as of: 1/26/17 11:34 AM.  Always use your most recent med list.                   Brand Name Dispense Instructions for use    acetaminophen 500 MG tablet    TYLENOL     Take 1,000 mg by mouth every 6 hours as needed for mild pain       albuterol (2.5 MG/3ML) 0.083% neb solution     30 vial    Take 1 vial (2.5 mg) by nebulization every 6 hours as needed for shortness of breath / dyspnea or wheezing       amLODIPine 5 MG tablet    NORVASC    31 tablet    TAKE 1 TABLET BY MOUTH EVERY DAY *HIGH BLOOD PRESSURE*       B-D SINGLE USE SWABS REGULAR Pads     100 each    FOR DIABETIC TESTING AS ORDERED       blood glucose calibration solution     1 each    USE TO CALIBRATE BLOOD GLUCOSE MONITOR WHEN STARTING A NEW VIAL OFSTRIPS       blood glucose monitoring lancets     1 Box    USE TO TEST BLOOD SUGAR EVERY DAY OR AS DIRECTED       blood glucose monitoring test strip    ONE TOUCH ULTRA    100 each    by In Vitro route daily       calcium-vitamin D 600-400 MG-UNIT per tablet    CALTRATE    31 tablet    TAKE 1 TABLET BY MOUTH EVERY DAY (CALCIUM DEFICIANCY)       cholecalciferol 400 UNITS tablet    Vitamin D    180 tablet    TAKE 2 TABLETS (800IU) BY M OUTH EVERY DAY       clotrimazole-betamethasone cream    LOTRISONE    60 g    Apply topically 2 times daily       Cranberry 500  MG Caps    CRANBERRY CONCENTRATE    200 capsule    Take 2 capsules (1,000 mg) by mouth 2 times daily       cyanocobalamin 1000 MCG/ML injection    VITAMIN B12     Inject 1 mL into the muscle every 30 days       * eucerin cream     454 g    Use daily at bedtime on feet and twice daily on tailbone       * NO STING BARRIER FILM Misc     1 each    No Sting Barrier Film Spray Apply as directed       Incontinence Brief Medium Misc     1 case    use at bedtime for incontinence       levothyroxine 50 MCG tablet    SYNTHROID/LEVOTHROID    31 tablet    TAKE 1 TABLET BY MOUTH EVER Y DAY       lovastatin 10 MG tablet    MEVACOR     TAKE 1 TAB BY MOUTH AT BEDTIME       metFORMIN 850 MG tablet    GLUCOPHAGE    62 tablet    TAKE 1 TABLET BY MOUTH TWIC E A DAY WITH MEALS       order for [facility]     4 Package    Equipment being ordered: 2 x 2 allevyn gentle foam dressing To be used twice daily to open area       polyethylene glycol powder    MIRALAX    510 g    Take 17 g (1 capful) by mouth every other day       ramipril 5 MG capsule    ALTACE    30 capsule    Take 1 capsule (5 mg) by mouth daily       SENNA-docusate sodium 8.6-50 MG Tabs     30 tablet    TAKE 1 TABLET BY MOUTH EVERY DAY AS NEEDED FOR CONSTIPATION       silver sulfADIAZINE 1 % cream    SILVADENE    50 g    Apply topically 2 times daily as needed       T.E.D. KNEE LENGTH/S-REGULAR Misc     1 each    1 Package daily       timolol 0.5 % ophthalmic solution    TIMOPTIC     Place 1 drop into both eyes daily       TUSSIN 100 MG/5ML Liqd   Generic drug:  guaiFENesin      Take 10 mLs by mouth every 4 hours as needed for cough       UTI-STAT Liqd      Take 30 mLs by mouth 2 times daily       * Notice:  This list has 2 medication(s) that are the same as other medications prescribed for you. Read the directions carefully, and ask your doctor or other care provider to review them with you.

## 2017-01-26 NOTE — NURSING NOTE
Chief Complaint   Patient presents with     Establish Care     discuss diabetes, medications       Initial /58 mmHg  Pulse 77  Temp(Src) 95.4  F (35.2  C) (Tympanic)  Resp 20  Ht   Wt 123 lb (55.792 kg)  SpO2 98%  Breastfeeding? No Estimated body mass index is 24.02 kg/(m^2) as calculated from the following:    Height as of 11/29/16: 5' (1.524 m).    Weight as of this encounter: 123 lb (55.792 kg).  BP completed using cuff size: large  ................Michael Colby LPN,   January 26, 2017,      11:10 AM,   [facility]

## 2017-01-26 NOTE — PATIENT INSTRUCTIONS
Okay to DC nitro, Pepto bismol and imodium     Decrease Altace to 5 mg daily    Bring back urine sample tomorrow.     Recheck sodium in 2 weeks after the medication change.

## 2017-01-26 NOTE — PROGRESS NOTES
SUBJECTIVE:                                                    Maggi Evans is a 86 year old female who presents to clinic today for the following health issues:      Establish Care/Transfer of Care    Patient has previously been under the care of Dr. Durbin. Patient requests transfer care to me.   Patient medications reconciled, PMH and Problem list reviewed and HM updated.    She is here with a . Her concerns include repeated UTIs.  Is currently on Macrobid for this.  Also would like to discuss hyponatremia, this is chronic.  She is on a drinking restriction and they have been using Gatorade instead of free water.    She recently had a [facility] pharmacy visit and the following recommendations were made (copied from progress note):     1.  MD may consider reduction in Ramipril dose to 5mg daily and follow-up BP as well as BMP in clinic in 2 weeks from change.  Caregiver to continue checking BPs at home, and recommend bringing BP monitor into next clinic visit to calibrate.  Could increase Amlodipine in future if BPs increase above goal <150/90mmHg with potential reduction in Ramipril dose.  2.  D'c prn Pepto Bismol - may increase risk of bleeding.  I will send order to caregiver.  3.  MD may consider switch from Metformin 850mg bid to Metformin ER 1000mg once daily with breakfast.  Continue to monitor blood sugars and for improvement in abdominal pain/diarrhea.  If necessary, could increase dose further in future.    The caregiver also would like her nitro and Imodium discontinued.  They are ordered PRN.  She has not used these is over 5 years.     Problem list and histories reviewed & adjusted, as indicated.  Additional history: none    Patient Active Problem List   Diagnosis     MRSA known     Fecal incontinence     Vitamin B12 deficiency disease     DM circ dis type II     Hyperlipidemia LDL goal <100     GERD (gastroesophageal reflux disease)     Glaucoma     Hypertension goal BP (blood  pressure) < 140/90     Advance Care Planning     Seizure (H)     Cognitive impairment - severe, multifactorial (MR, dementia), legally incompetent     Abnormal neurological findings - chronic left tongue deviation, hypertonicity     SIADH (syndrome of inappropriate ADH production) (H)     Dermatophytosis of nail     At risk for falling     Cough     Edema extremities     Skin breakdown     Drug ingestion, accidental     History of recurrent UTIs - last Dec 2014 E coli     Mitral regurgitation     History of skin cancer     Mild major depression (H)     CAD (coronary artery disease)     Health Care Home     UTI (urinary tract infection)     Type 2 diabetes mellitus with diabetic neuropathy     Ovarian cyst     Constipation, unspecified constipation type     Acute posthemorrhagic anemia     Anemia     Schizoaffective disorder, unspecified (H)     Hypothyroidism     Dysuria     Laceration of scalp without foreign body     Chronic hyponatremia     Anemia due to blood loss, acute     External hemorrhoids with complication     Type 2 diabetes mellitus without complication, without long-term current use of insulin (H)     Past Surgical History   Procedure Laterality Date     Hc remv cataract extracap,insert lens  02/20/2003     left     Hc remv cataract extracap,insert lens  1/16/2003     right eye     Colonoscopy  05/18/09     Ankle surgery         Social History   Substance Use Topics     Smoking status: Never Smoker      Smokeless tobacco: Never Used     Alcohol Use: No     Family History   Problem Relation Age of Onset     Cardiovascular Mother      DIABETES Mother      CEREBROVASCULAR DISEASE Brother      Neurologic Disorder Brother      HEART DISEASE Sister      Breast Cancer Sister          Current Outpatient Prescriptions   Medication Sig Dispense Refill     lovastatin (MEVACOR) 10 MG tablet TAKE 1 TAB BY MOUTH AT BEDTIME  0     ramipril (ALTACE) 5 MG capsule Take 1 capsule (5 mg) by mouth daily 30 capsule 3      silver sulfADIAZINE (SILVADENE) 1 % cream Apply topically 2 times daily as needed 50 g 1     metFORMIN (GLUCOPHAGE) 850 MG tablet TAKE 1 TABLET BY MOUTH TWIC E A DAY WITH MEALS 62 tablet 3     levothyroxine (SYNTHROID/LEVOTHROID) 50 MCG tablet TAKE 1 TABLET BY MOUTH EVER Y DAY 31 tablet 3     cholecalciferol (VITAMIN D) 400 UNITS tablet TAKE 2 TABLETS (800IU) BY M OUTH EVERY  tablet 1     Cranberry-Vitamin C-Inulin (UTI-STAT) LIQD Take 30 mLs by mouth 2 times daily       clotrimazole-betamethasone (LOTRISONE) cream Apply topically 2 times daily 60 g 1     amLODIPine (NORVASC) 5 MG tablet TAKE 1 TABLET BY MOUTH EVERY DAY *HIGH BLOOD PRESSURE* 31 tablet 3     blood glucose monitoring (ONE TOUCH ULTRA) test strip by In Vitro route daily 100 each prn     acetaminophen (TYLENOL) 500 MG tablet Take 1,000 mg by mouth every 6 hours as needed for mild pain       guaiFENesin (TUSSIN) 100 MG/5ML LIQD Take 10 mLs by mouth every 4 hours as needed for cough       Skin Protectants, Misc. (NO STING BARRIER FILM) MISC No Sting Barrier Film Spray Apply as directed 1 each 11     calcium-vitamin D (CALTRATE) 600-400 MG-UNIT per tablet TAKE 1 TABLET BY MOUTH EVERY DAY (CALCIUM DEFICIANCY) 31 tablet 11     Elastic Bandages & Supports (T.E.D. KNEE LENGTH/S-REGULAR) MISC 1 Package daily 1 each 2     Sennosides-Docusate Sodium (SENNA-DOCUSATE SODIUM) 8.6-50 MG TABS TAKE 1 TABLET BY MOUTH EVERY DAY AS NEEDED FOR CONSTIPATION 30 tablet 5     polyethylene glycol (MIRALAX) powder Take 17 g (1 capful) by mouth every other day 510 g 1     albuterol (2.5 MG/3ML) 0.083% nebulizer solution Take 1 vial (2.5 mg) by nebulization every 6 hours as needed for shortness of breath / dyspnea or wheezing 30 vial 0     cyanocobalamin (VITAMIN B12) 1000 MCG/ML injection Inject 1 mL into the muscle every 30 days       blood glucose monitoring (ONE TOUCH DELICA) lancets USE TO TEST BLOOD SUGAR EVERY DAY OR AS DIRECTED 1 Box 3     Cranberry (CRANBERRY  CONCENTRATE) 500 MG CAPS Take 2 capsules (1,000 mg) by mouth 2 times daily 200 capsule 6     blood glucose calibration (ONETOUCH ULTRA CONTROL) solution USE TO CALIBRATE BLOOD GLUCOSE MONITOR WHEN STARTING A NEW VIAL OFSTRIPS 1 each 3     ORDER FOR DME Equipment being ordered: 2 x 2 allevyn gentle foam dressing  To be used twice daily to open area 4 Package 6     timolol (TIMOPTIC) 0.5 % ophthalmic solution Place 1 drop into both eyes daily        Skin Protectants, Misc. (EUCERIN) cream Use daily at bedtime on feet and twice daily on tailbone 454 g 0     INCONTINENCE BRIEF MEDIUM MISC use at bedtime for incontinence 1 case prn     [DISCONTINUED] ramipril (ALTACE) 10 MG capsule Take 1 capsule (10 mg) by mouth daily 62 capsule 3     Alcohol Swabs (B-D SINGLE USE SWABS REGULAR) PADS FOR DIABETIC TESTING AS ORDERED 100 each 0     [DISCONTINUED] lovastatin (MEVACOR) 20 MG tablet 1/2  TABLET DAILY at bedtime 45 tablet 1     Allergies   Allergen Reactions     Adhesive Tape Rash     rash--also metal   Pt states bandaids are OK     Penicillins Rash     rash     BP Readings from Last 3 Encounters:   01/26/17 108/58   01/12/17 110/56   01/11/17 82/58    Wt Readings from Last 3 Encounters:   01/26/17 123 lb (55.792 kg)   11/29/16 122 lb 6.4 oz (55.52 kg)   08/04/16 124 lb 1.6 oz (56.291 kg)                    ROS:  C: NEGATIVE for fever, chills, change in weight  E/M: NEGATIVE for ear, mouth and throat problems  R: NEGATIVE for significant cough or SOB  CV: NEGATIVE for chest pain, palpitations or peripheral edema  PSYCHIATRIC: getting over a UTI.  Mentation is improving closer to normal.     OBJECTIVE:                                                    /58 mmHg  Pulse 77  Temp(Src) 95.4  F (35.2  C) (Tympanic)  Resp 20  Ht   Wt 123 lb (55.792 kg)  SpO2 98%  Breastfeeding? No  Body mass index is 24.02 kg/(m^2).  GENERAL: no distress, frail, elderly and fatigued  RESP: lungs clear to auscultation - no rales, rhonchi  or wheezes  CV: regular rate and rhythm, normal S1 S2, no S3 or S4, no murmur, click or rub, no peripheral edema and peripheral pulses strong  ABDOMEN: soft, nontender, no hepatosplenomegaly, no masses and bowel sounds normal  MS: no gross musculoskeletal defects noted, no edema    Diagnostic Test Results:  none      ASSESSMENT/PLAN:                                                    Hypertension; controlled   Associated with the following complications:    Diabetes   Plan:  The following changes are made - we are going to decrease Ramipril dose to 5 mg daily.  See if this improves her hyponatremia.  Her BP has been trending low at several visits.  Recheck BMP in 2 weeks.   - ramipril (ALTACE) 5 MG capsule; Take 1 capsule (5 mg) by mouth daily  Dispense: 30 capsule; Refill: 3  - Basic metabolic panel  (Ca, Cl, CO2, Creat, Gluc, K, Na, BUN); Future    1. Establishing care with new doctor, encounter for      2. History of frequent urinary tract infections  Ongoing issue.  Typically group home will call with symptoms and drop off a urine sample.  Has been resistant to many antibiotics and typically we obtain a culture prior to starting them.  Will place a standing order with UA to be collected with any symptoms.  She is currently finishing up Macrobid.  Symptoms improving. Will do a repeat UA when antibiotics are complete.  Also has had urethral dilation done in the past to try and improve this.   - *UA reflex to Microscopic and Culture ([facility] (except [place] and [place]); Standing    See Patient Instructions    JORDY Lawson [facility]

## 2017-01-30 NOTE — TELEPHONE ENCOUNTER
----- Message from JORDY Lawson CNP sent at 1/30/2017 11:21 AM CST -----  Have them start the usual 5 days of Rocephin injections.  Please sandra up order.     Electronically signed by Gia Khoury CNP.

## 2017-02-04 NOTE — ED AVS SNAPSHOT
[facility] Emergency Department    [address] [place] [address]    Phone:  [phone]    Fax:  [phone]                                       Maggi Evans   MRN: [id number]    Department:  [facility] Emergency Department   Date of Visit:  2/4/2017           After Visit Summary Signature Page     I have received my discharge instructions, and my questions have been answered. I have discussed any challenges I see with this plan with the nurse or doctor.    ..........................................................................................................................................  Patient/Patient Representative Signature      ..........................................................................................................................................  Patient Representative Print Name and Relationship to Patient    ..................................................               ................................................  Date                                            Time    ..........................................................................................................................................  Reviewed by Signature/Title    ...................................................              ..............................................  Date                                                            Time

## 2017-02-04 NOTE — ED AVS SNAPSHOT
[facility]    [address]     [place] [address]    Phone:  [phone]    Fax:  [phone]                                       Maggi Evans   MRN: [id number]    Department:  [facility]   Date of Visit:  2/4/2017           Patient Information     Date Of Birth          5/31/1930        Your diagnoses for this visit were:     Dysuria frequent difficult-to-treat UTIs       You were seen by Keon Vides MD.      Follow-up Information     Follow up with Gia Khoury APRN CNP.    Specialty:  Nurse Practitioner - Family    Why:  this next week    Contact information:    [facility]  [address] [facility] [address]  [phone]          Discharge Instructions       We gave your last Rocephin shot tonight.  (5th of 5)  Recheck with your primary provider next week.  It was nice visiting with both you tonight.   I am glad you are feeling better.    Thank you for choosing [facility]. We appreciate the opportunity to meet your urgent medical needs. Please let us know if we could have done anything to make your stay more satisfying.    After discharge, please closely monitor for any new or worsening symptoms. Return to the Emergency Department if you develop any acute worsening signs or symptoms.    If you had lab work, cultures or imaging studies done during your stay, the final results may still be pending. We will call you if your plan of care needs to change. However, if you are not improving as expected, please follow up with your primary care provider or clinic.     Start any prescription medications that were prescribed to you and take them as directed.     Please see additional handouts that may be pertinent to your condition.        24 Hour Appointment Hotline       To make an appointment at any [facility], call [phone] ([phone]). If you don't have a family doctor or clinic, we  will help you find one. [facility] are conveniently located to serve the needs of you and your family.             Review of your medicines      Our records show that you are taking the medicines listed below. If these are incorrect, please call your family doctor or clinic.        Dose / Directions Last dose taken    acetaminophen 500 MG tablet   Commonly known as:  TYLENOL   Dose:  1000 mg        Take 1,000 mg by mouth every 6 hours as needed for mild pain   Refills:  0        albuterol (2.5 MG/3ML) 0.083% neb solution   Dose:  1 vial   Quantity:  30 vial        Take 1 vial (2.5 mg) by nebulization every 6 hours as needed for shortness of breath / dyspnea or wheezing   Refills:  0        amLODIPine 5 MG tablet   Commonly known as:  NORVASC   Quantity:  31 tablet        TAKE 1 TABLET BY MOUTH EVERY DAY *HIGH BLOOD PRESSURE*   Refills:  3        B-D SINGLE USE SWABS REGULAR Pads   Quantity:  100 each        FOR DIABETIC TESTING AS ORDERED   Refills:  0        blood glucose calibration solution   Quantity:  1 each        USE TO CALIBRATE BLOOD GLUCOSE MONITOR WHEN STARTING A NEW VIAL OFSTRIPS   Refills:  3        blood glucose monitoring lancets   Quantity:  1 Box        USE TO TEST BLOOD SUGAR EVERY DAY OR AS DIRECTED   Refills:  3        blood glucose monitoring test strip   Commonly known as:  ONE TOUCH ULTRA   Quantity:  100 each        by In Vitro route daily   Refills:  prn        calcium-vitamin D 600-400 MG-UNIT per tablet   Commonly known as:  CALTRATE   Quantity:  31 tablet        TAKE 1 TABLET BY MOUTH EVERY DAY (CALCIUM DEFICIANCY)   Refills:  11        cefTRIAXone 1 GM vial   Commonly known as:  ROCEPHIN   Quantity:  10 mL        Inject 1 g into the muscle for 5 days.   Refills:  5        cholecalciferol 400 UNITS tablet   Commonly known as:  Vitamin D   Quantity:  180 tablet        TAKE 2 TABLETS (800IU) BY M OUTH EVERY DAY   Refills:  1        clotrimazole-betamethasone cream   Commonly known as:   LOTRISONE   Quantity:  60 g        Apply topically 2 times daily   Refills:  1        Cranberry 500 MG Caps   Commonly known as:  CRANBERRY CONCENTRATE   Dose:  2 capsule   Quantity:  200 capsule        Take 2 capsules (1,000 mg) by mouth 2 times daily   Refills:  6        cyanocobalamin 1000 MCG/ML injection   Commonly known as:  VITAMIN B12   Dose:  1 mL        Inject 1 mL into the muscle every 30 days   Refills:  0        * eucerin cream   Quantity:  454 g        Use daily at bedtime on feet and twice daily on tailbone   Refills:  0        * NO STING BARRIER FILM Misc   Quantity:  1 each        No Sting Barrier Film Spray Apply as directed   Refills:  11        Incontinence Brief Medium Misc   Quantity:  1 case        use at bedtime for incontinence   Refills:  prn        levothyroxine 50 MCG tablet   Commonly known as:  SYNTHROID/LEVOTHROID   Quantity:  31 tablet        TAKE 1 TABLET BY MOUTH EVER Y DAY   Refills:  3        lidocaine 1 % injection   Quantity:  20 mL        ADD 2.1ML TO 1 VIAL OF CEFTRIAXONE FOR INJECTION.   Refills:  1        lovastatin 10 MG tablet   Commonly known as:  MEVACOR        TAKE 1 TAB BY MOUTH AT BEDTIME   Refills:  0        metFORMIN 850 MG tablet   Commonly known as:  GLUCOPHAGE   Quantity:  62 tablet        TAKE 1 TABLET BY MOUTH TWIC E A DAY WITH MEALS   Refills:  3        order for DME   Quantity:  4 Package        Equipment being ordered: 2 x 2 allevyn gentle foam dressing To be used twice daily to open area   Refills:  6        polyethylene glycol powder   Commonly known as:  MIRALAX   Dose:  1 capful   Quantity:  510 g        Take 17 g (1 capful) by mouth every other day   Refills:  1        ramipril 5 MG capsule   Commonly known as:  ALTACE   Dose:  5 mg   Quantity:  30 capsule        Take 1 capsule (5 mg) by mouth daily   Refills:  3        SENNA-docusate sodium 8.6-50 MG Tabs   Quantity:  30 tablet        TAKE 1 TABLET BY MOUTH EVERY DAY AS NEEDED FOR CONSTIPATION  "  Refills:  5        silver sulfADIAZINE 1 % cream   Commonly known as:  SILVADENE   Quantity:  50 g        Apply topically 2 times daily as needed   Refills:  1        Syringe/Needle (Disp) 23G X 1-1/2\" 3 ML Misc   Dose:  2 each   Quantity:  10 each        Inject 2 each into the muscle daily For use with rocephin and lidocaine   Refills:  0        T.E.D. KNEE LENGTH/S-REGULAR Misc   Dose:  1 Package   Quantity:  1 each        1 Package daily   Refills:  2        timolol 0.5 % ophthalmic solution   Commonly known as:  TIMOPTIC   Dose:  1 drop        Place 1 drop into both eyes daily   Refills:  0        TUSSIN 100 MG/5ML Liqd   Dose:  10 mL   Generic drug:  guaiFENesin        Take 10 mLs by mouth every 4 hours as needed for cough   Refills:  0        UTI-STAT Liqd   Dose:  30 mL        Take 30 mLs by mouth 2 times daily   Refills:  0        * Notice:  This list has 2 medication(s) that are the same as other medications prescribed for you. Read the directions carefully, and ask your doctor or other care provider to review them with you.            Orders Needing Specimen Collection     None      Pending Results     No orders found from 2/3/2017 to 2/5/2017.            Pending Culture Results     No orders found from 2/3/2017 to 2/5/2017.            Thank you for choosing [facility]       Thank you for choosing [facility] for your care. Our goal is always to provide you with excellent care. Hearing back from our patients is one way we can continue to improve our services. Please take a few minutes to complete the written survey that you may receive in the mail after you visit with us. Thank you!        [employer] Information     [employer] gives you secure access to your electronic health record. If you see a primary care provider, you can also send messages to your care team and make appointments. If you have questions, please call your primary care clinic.  If you do not have a primary care provider, please call [phone] " and they will assist you.        Care EveryWhere ID     This is your Care EveryWhere ID. This could be used by other organizations to access your [facility] medical records  [id number]        After Visit Summary       This is your record. Keep this with you and show to your community pharmacist(s) and doctor(s) at your next visit.

## 2017-02-05 NOTE — TELEPHONE ENCOUNTER
"Call Type: Triage Call    Presenting Problem: \"I gave the evening medications about an hour  late. Norvasc, Mevacor, (which are ordered once daily,) and  cranberry concentrate, Mirilax.\"  Triage Note:  Guideline Title: No Guideline - Advice Per Reference (Adult)  Recommended Disposition: Provide Home/Self Care  Original Inclination: Wanted to speak with a nurse  Override Disposition:  Intended Action: Follow advice given  Physician Contacted: No  PROVIDE HOME/SELF CARE ?  YES  SEE ED IMMEDIATELY ? NO  CALL POISON CENTER IMMEDIATELY ? NO  CALL LOCAL AGENCY IMMEDIATELY ? NO  SEE DENTIST WITHIN 4 HOURS ? NO  SEE DENTIST WITHIN 24 HOURS ? NO  CALL LOCAL AGENCY WITHIN 24 HOURS ? NO  SEE DENTIST WITHIN 72 HOURS ? NO  ACTIVATE  ? NO  SEE PROVIDER WITHIN 4 HOURS ? NO  SEE PROVIDER WITHIN 24 HOURS ? NO  CALL PROVIDER WITHIN 24 HOURS ? NO  SEE PROVIDER WITHIN 72 HOURS ? NO  SEE PROVIDER WITHIN 2 WEEKS ? NO  CALL PROVIDER WITHIN 72 HOURS ? NO  SEE DENTIST WITHIN 2 WEEKS ? NO  CALL PROVIDER IMMEDIATELY ? NO  Physician Instructions:  Care Advice:  "

## 2017-02-05 NOTE — DISCHARGE INSTRUCTIONS
We gave your last Rocephin shot tonight.  (5th of 5)  Recheck with your primary provider next week.  It was nice visiting with both you tonight.   I am glad you are feeling better.    Thank you for choosing [facility]. We appreciate the opportunity to meet your urgent medical needs. Please let us know if we could have done anything to make your stay more satisfying.    After discharge, please closely monitor for any new or worsening symptoms. Return to the Emergency Department if you develop any acute worsening signs or symptoms.    If you had lab work, cultures or imaging studies done during your stay, the final results may still be pending. We will call you if your plan of care needs to change. However, if you are not improving as expected, please follow up with your primary care provider or clinic.     Start any prescription medications that were prescribed to you and take them as directed.     Please see additional handouts that may be pertinent to your condition.

## 2017-02-05 NOTE — ED PROVIDER NOTES
History     Chief Complaint   Patient presents with     Imm/Inj     HPI  Maggi Evans is a 86 year old female who presented to the ED today to get her 5th of 5 doses of IM Rocephin.  He has had difficult to treat UTIs in the past and has done well with 5 days of IM Rocephin.  It was ordered on January 30 but D she did not get the 1st dose until the 31st.  Home care had been coming to do it but was unable to do so today.  Recent notes were reviewed.  Her UC showed mixed growth.  She is feeling better than she was 5 days ago.    Past Medical History   Diagnosis Date     Sprain of hand, unspecified site 05/05/95     Closed fracture of metatarsal bone(s) 09/15/94     Open wound of finger(s) , without mention of complication 06/13/94     Attention to dressings and sutures 06/20/94     Contact dermatitis and other eczema, due to unspecified cause 05/03/94     Type II or unspecified type diabetes mellitus without mention of complication, not stated as uncontrolled 11/18/93     Hypoglycemia, unspecified 10/11/93     Cellulitis and abscess of upper arm and forearm 11/02/92     Unspecified schizophrenia, unspecified condition 10/12/92     Other malaise and fatigue 9/14/2005     weakness     Hyposmolality and/or hyponatremia 08/23/2007     Depressive disorder      Unspecified cerebral artery occlusion with cerebral infarction      Pneumonia 12/30/2014     Sepsis (H) 12/30/2014     History of skin cancer 1/15/2015     Squamous cell carcinoma, followed by dermatology        Past Surgical History   Procedure Laterality Date     Hc remv cataract extracap,insert lens  02/20/2003     left     Hc remv cataract extracap,insert lens  1/16/2003     right eye     Colonoscopy  05/18/09     Ankle surgery         Social History     Social History     Marital Status: Single     Spouse Name: N/A     Number of Children: N/A     Years of Education: N/A     Occupational History     [employer]      Social History Main Topics     Smoking status:  Never Smoker      Smokeless tobacco: Never Used     Alcohol Use: No     Drug Use: No     Sexual Activity: Not Currently     Other Topics Concern      Service No     Blood Transfusions No     Caffeine Concern No     coffee 1-2 ;cups/day     Occupational Exposure No     Hobby Hazards No     Sleep Concern No     Stress Concern No     Weight Concern No     wt seems to have stabilized.     Special Diet Yes     diabetic     Back Care No     Exercise Yes     Bike Helmet No     Seat Belt Yes     Self-Exams No     Social History Narrative          Allergies   Allergen Reactions     Adhesive Tape Rash     rash--also metal   Pt states bandaids are OK     Penicillins Rash     rash       Med List Reviewed         I have reviewed the Medications, Allergies, Past Medical and Surgical History, and Social History in the Epic system.    Review of Systems   Constitutional: Negative for fever.   Genitourinary:        Freq UTIs       Physical Exam   BP: (!) 172/96 mmHg  Heart Rate: 86  Temp: 97.2  F (36.2  C)  Resp: 20  Height: 152.4 cm (5')  Weight: 55.792 kg (123 lb)  SpO2: 96 %  Physical Exam   Constitutional: She appears well-developed and well-nourished. No distress.   Pulmonary/Chest: Effort normal. No respiratory distress.   Neurological: She is alert.   Skin: Skin is warm and dry.   Psychiatric: She has a normal mood and affect.       ED Course    Rocephin 1gm IM with lido given by nursing staff.  This will complete her 5 day course.  She will follow up with her primary provider next week.       Procedures            Assessments & Plan (with Medical Decision Making)    (R30.0) Dysuria  Comment: frequent difficult-to-treat UTIs  Plan: she was given her 5th of 5 Rocephin injections today in the ED, completing her course.  She will recheck in clinic this next week.  She states she is feeling better after starting the Rocephin.  Her UC only showed mixed growth on 1/27/2017.          I have reviewed the nursing notes.    I  have reviewed the findings, diagnosis, plan and need for follow up with the patient.    New Prescriptions    No medications on file       Final diagnoses:   Dysuria - frequent difficult-to-treat UTIs       2/4/2017   [facility] EMERGENCY DEPARTMENT      Keon Vides MD  02/04/17 [id number]

## 2017-02-05 NOTE — ED NOTES
Here for a rocephin injection, doctor ordered 5 days injection but home care nurse could not come today.

## 2017-02-27 NOTE — TELEPHONE ENCOUNTER
She has a standing order for a UA.  They can just drop one off.  If they want to check for yeast also, I would need to put in an order for that.     Electronically signed by Gia Khoury CNP.

## 2017-02-27 NOTE — TELEPHONE ENCOUNTER
Reason for Call:  Other      Detailed comments:  Erica is at the group home with Maggi and she has symptoms of a UTI and poss yeast infection. Will need orders.     Phone Number Patient can be reached at: Other phone number:  [phone]    Best Time: any     Can we leave a detailed message on this number? YES    Call taken on 2/27/2017 at 8:28 AM by Flor Patten

## 2017-02-27 NOTE — PROGRESS NOTES
[facility] now requests orders and shares plan of care/discharge summaries for some patients through [employer].  Please REPLY TO THIS MESSAGE in order to give authorization for orders when needed.  This is considered a verbal order, you will still receive a faxed copy of orders for signature.  Thank you for your assistance in improving collaboration for our patients.    ORDER   SN 1month 2, 7 prn      SN to perform assessment with focus on medication management and reconciliation, pain management, mental health status and need for referral or change in treatment plan, /GI status, CVP status, skin integrity, falls risk, and to notify physician for the following clinical findings outside standard parameters. Instruct on disease process, signs/symptoms of complications to report to agency/physician/911, emergency/safety and falls prevention plan, medication effects/SE, new/high risk/changed medications, diet, and activity. Implement interventions to monitor and mitigate pain, prevent pressure ulcers and confirm proper foot care.   7 prn visits for medical symptoms that would necessitate an unplanned visit, supervisory visits per agency protocol, recertification assessments.

## 2017-03-01 NOTE — TELEPHONE ENCOUNTER
----- Message from JORDY Lawson CNP sent at 3/1/2017  9:24 AM CST -----  Please call and advise she has another UTI and will need to start Rocephin injections again x 5 days.  Please place orders.     Electronically signed by Gia Khoury CNP.

## 2017-03-01 NOTE — TELEPHONE ENCOUNTER
I have attempted to contact this patient by phone with the following results: left message to return my call on answering machine.    Orders pending providers approval, this is usually given by the home care nurse. Natacha Castro MA     3/1/2017

## 2017-03-06 NOTE — TELEPHONE ENCOUNTER
Reason for call: Order   Order or referral being requested: urine nataliya for UTI  Reason for request: Pt last dose of abx 3/6, still having behaviors common with uti  Date needed: as soon as possible  Has the patient been seen by the PCP for this problem? NO    Additional comments: chronic issue    Phone Number Pt can be reached at: Other phone number:  [phone] - Bethany, home health nurse  Best Time: anytime  Can we leave a detailed message on this number? YES

## 2017-03-07 NOTE — TELEPHONE ENCOUNTER
Orders placed for her to leave a UA.     Please advise caregiver.     Electronically signed by Gia Khoury CNP.

## 2017-03-10 NOTE — LETTER
[facility]  [address] [facility] [address]  [phone]      March 13, 2017      Maggi Evans  [address] [facility] [address]        Dear Prasanna Tay you will find results from your recent visit.    1.  Your sodium level is 131.  This is good for you and I don't think it is causing any of your symptoms.   2.  Your A1C is 7.0, which is great.  No need to change anything with the diabetes plan.   3.  The urine showed only a few bacteria on culture and mostly yeast.  I think you should try OTC vaginal yeast medication - such as monistat once a night for 7 nights.     If not improving after that (1 week), we can run another urine and there is already an order placed for that.    Please call the clinic if you have any further questions.       Sincerely,    Gia Khoury, JORDY CNP      [place]

## 2017-03-10 NOTE — PROGRESS NOTES
SUBJECTIVE:                                                    Maggi Evans is a 86 year old female who presents to clinic today for the following health issues:      Lab follow up        Description (location/character/radiation): urine brought in yesterday    Accompanying signs and symptoms: OCD worsening    History (similar episodes/previous evaluation): ocd worsens with UTI or labs out of range    Precipitating or alleviating factors: None    Therapies tried and outcome: None      OCD follow up        Description (location/character/radiation): worsening    Length of time: 1 month    History (similar episodes/previous evaluation): ocd worsens with UTI or labs out of range    Precipitating or alleviating factors: None    Therapies tried and outcome: None currently, used to take Risperidone      She has been having more confusion and OCD behaviors - picking mostly.  This typically happens when she has a UTI or her sodium level is low.  Was treated for UTI with rocephin injections x 5 days.  Ended this on Monday of this week.     Problem list and histories reviewed & adjusted, as indicated.  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)     Dermatophytosis of nail     At risk for falling     Cough     Edema extremities     Skin breakdown     Drug ingestion, accidental     History of recurrent UTIs - last Dec 2014 E coli     Mitral regurgitation     History of skin cancer     Mild major  "depression (H)     CAD (coronary artery disease)     Health Care Home     UTI (urinary tract infection)     Type 2 diabetes mellitus with diabetic neuropathy     Ovarian cyst     Constipation, unspecified constipation type     Acute posthemorrhagic anemia     Anemia     Schizoaffective disorder, unspecified (H)     Hypothyroidism     Dysuria     Laceration of scalp without foreign body     Chronic hyponatremia     Anemia due to blood loss, acute     External hemorrhoids with complication     Type 2 diabetes mellitus without complication, without long-term current use of insulin (H)     Past Surgical History   Procedure Laterality Date     Hc remv cataract extracap,insert lens  02/20/2003     left     Hc remv cataract extracap,insert lens  1/16/2003     right eye     Colonoscopy  05/18/09     Ankle surgery         Social History   Substance Use Topics     Smoking status: Never Smoker     Smokeless tobacco: Never Used     Alcohol use No     Family History   Problem Relation Age of Onset     Cardiovascular Mother      DIABETES Mother      CEREBROVASCULAR DISEASE Brother      Neurologic Disorder Brother      HEART DISEASE Sister      Breast Cancer Sister          Current Outpatient Prescriptions   Medication Sig Dispense Refill     Alcohol Swabs (B-D SINGLE USE SWABS REGULAR) PADS FOR DIABETIC TESTING AS ORDERED 100 each 3     lidocaine 1 % injection ADD 2.1ML TO 1 VIAL OF CEFTRIAXONE FOR INJECTION. 20 mL 1     Syringe/Needle, Disp, 23G X 1-1/2\" 3 ML MISC Inject 2 each into the muscle daily For use with rocephin and lidocaine 10 each 0     lovastatin (MEVACOR) 10 MG tablet TAKE 1 TAB BY MOUTH AT BEDTIME  0     ramipril (ALTACE) 5 MG capsule Take 1 capsule (5 mg) by mouth daily 30 capsule 3     silver sulfADIAZINE (SILVADENE) 1 % cream Apply topically 2 times daily as needed 50 g 1     metFORMIN (GLUCOPHAGE) 850 MG tablet TAKE 1 TABLET BY MOUTH TWIC E A DAY WITH MEALS 62 tablet 3     levothyroxine (SYNTHROID/LEVOTHROID) " 50 MCG tablet TAKE 1 TABLET BY MOUTH EVER Y DAY 31 tablet 3     cholecalciferol (VITAMIN D) 400 UNITS tablet TAKE 2 TABLETS (800IU) BY M OUTH EVERY  tablet 1     Cranberry-Vitamin C-Inulin (UTI-STAT) LIQD Take 30 mLs by mouth 2 times daily       clotrimazole-betamethasone (LOTRISONE) cream Apply topically 2 times daily 60 g 1     amLODIPine (NORVASC) 5 MG tablet TAKE 1 TABLET BY MOUTH EVERY DAY *HIGH BLOOD PRESSURE* 31 tablet 3     blood glucose monitoring (ONE TOUCH ULTRA) test strip by In Vitro route daily 100 each prn     acetaminophen (TYLENOL) 500 MG tablet Take 1,000 mg by mouth every 6 hours as needed for mild pain       guaiFENesin (TUSSIN) 100 MG/5ML LIQD Take 10 mLs by mouth every 4 hours as needed for cough       Skin Protectants, Misc. (NO STING BARRIER FILM) MISC No Sting Barrier Film Spray Apply as directed 1 each 11     calcium-vitamin D (CALTRATE) 600-400 MG-UNIT per tablet TAKE 1 TABLET BY MOUTH EVERY DAY (CALCIUM DEFICIANCY) 31 tablet 11     Elastic Bandages & Supports (T.E.D. KNEE LENGTH/S-REGULAR) MISC 1 Package daily 1 each 2     Sennosides-Docusate Sodium (SENNA-DOCUSATE SODIUM) 8.6-50 MG TABS TAKE 1 TABLET BY MOUTH EVERY DAY AS NEEDED FOR CONSTIPATION 30 tablet 5     polyethylene glycol (MIRALAX) powder Take 17 g (1 capful) by mouth every other day 510 g 1     albuterol (2.5 MG/3ML) 0.083% nebulizer solution Take 1 vial (2.5 mg) by nebulization every 6 hours as needed for shortness of breath / dyspnea or wheezing 30 vial 0     cyanocobalamin (VITAMIN B12) 1000 MCG/ML injection Inject 1 mL into the muscle every 30 days       blood glucose monitoring (ONE TOUCH DELICA) lancets USE TO TEST BLOOD SUGAR EVERY DAY OR AS DIRECTED 1 Box 3     Cranberry (CRANBERRY CONCENTRATE) 500 MG CAPS Take 2 capsules (1,000 mg) by mouth 2 times daily 200 capsule 6     blood glucose calibration (ONETOUCH ULTRA CONTROL) solution USE TO CALIBRATE BLOOD GLUCOSE MONITOR WHEN STARTING A NEW VIAL OFSTRIPS 1 each 3      ORDER FOR DME Equipment being ordered: 2 x 2 allevyn gentle foam dressing  To be used twice daily to open area 4 Package 6     timolol (TIMOPTIC) 0.5 % ophthalmic solution Place 1 drop into both eyes daily        Skin Protectants, Misc. (EUCERIN) cream Use daily at bedtime on feet and twice daily on tailbone 454 g 0     INCONTINENCE BRIEF MEDIUM MISC use at bedtime for incontinence 1 case prn     Allergies   Allergen Reactions     Adhesive Tape Rash     rash--also metal   Pt states bandaids are OK     Penicillins Rash     rash     BP Readings from Last 3 Encounters:   03/10/17 134/66   02/04/17 (!) 172/96   01/26/17 108/58    Wt Readings from Last 3 Encounters:   03/10/17 123 lb (55.8 kg)   02/04/17 123 lb (55.8 kg)   01/26/17 123 lb (55.8 kg)                    Reviewed and updated as needed this visit by clinical staff  Tobacco  Allergies  Meds  Med Hx  Surg Hx  Fam Hx  Soc Hx      Reviewed and updated as needed this visit by Provider         ROS:  CONSTITUTIONAL:NEGATIVE for fever, chills, change in weight  E/M: NEGATIVE for ear, mouth and throat problems  R: NEGATIVE for significant cough or SOB  CV: NEGATIVE for chest pain, palpitations or peripheral edema  GI: NEGATIVE for nausea, abdominal pain, heartburn, or change in bowel habits  : NEGATIVE for dysuria, hematuria, flank pain  NEURO: POSITIVE for increased confusion as above     OBJECTIVE:                                                    /66  Pulse 90  Temp 95.8  F (35.4  C) (Tympanic)  Resp 24  Wt 123 lb (55.8 kg)  SpO2 98%  BMI 24.02 kg/m2  Body mass index is 24.02 kg/(m^2).  GENERAL: alert, no distress, pale, frail and elderly  NECK: no adenopathy, no asymmetry, masses, or scars and thyroid normal to palpation  RESP: lungs clear to auscultation - no rales, rhonchi or wheezes  CV: regular rate and rhythm, normal S1 S2, no S3 or S4, no murmur, click or rub, no peripheral edema and peripheral pulses strong  ABDOMEN: soft, nontender, no  hepatosplenomegaly, no masses and bowel sounds normal  MS: no gross musculoskeletal defects noted, no edema    Diagnostic Test Results:  Office Visit on 03/10/2017   Component Date Value Ref Range Status     Sodium 03/10/2017 131* 133 - 144 mmol/L Final     Potassium 03/10/2017 4.6  3.4 - 5.3 mmol/L Final     Chloride 03/10/2017 93* 94 - 109 mmol/L Final     Carbon Dioxide 03/10/2017 31  20 - 32 mmol/L Final     Anion Gap 03/10/2017 7  3 - 14 mmol/L Final     Glucose 03/10/2017 135* 70 - 99 mg/dL Final     Urea Nitrogen 03/10/2017 18  7 - 30 mg/dL Final     Creatinine 03/10/2017 0.52  0.52 - 1.04 mg/dL Final     GFR Estimate 03/10/2017   >60 mL/min/1.7m2 Final                    Value:>90  Non  GFR Calc       GFR Estimate If Black 03/10/2017   >60 mL/min/1.7m2 Final                    Value:>90   GFR Calc       Calcium 03/10/2017 8.8  8.5 - 10.1 mg/dL Final     Hemoglobin A1C 03/10/2017 7.0* 4.3 - 6.0 % Final            ASSESSMENT/PLAN:                                                        1. Disorientation  Sodium level is stable at 131, typically runs 129-132 so I don't think that is causing her increased confusion.  She is already on a fluid restriction and they add salt to all her meals.  This may just be her age and chronic mental health issues.    - Basic metabolic panel  (Ca, Cl, CO2, Creat, Gluc, K, Na, BUN)    2. Type 2 diabetes mellitus with hyperglycemia, without long-term current use of insulin (H)  They are worried about some high blood sugar readings lately - 140-170 pre-meals.  Will check an A1C, but I advised at her age this is likely fine.   - Hemoglobin A1c    Will await urine culture to treat as needed.  This should be done by Monday.       See Patient Instructions    JORDY Lawson [facility]

## 2017-03-10 NOTE — NURSING NOTE
Chief Complaint   Patient presents with     RECHECK     labs     Obsessive Compulsive Disorder       Initial /66  Pulse 90  Temp 95.8  F (35.4  C) (Tympanic)  Resp 24  Wt 123 lb (55.8 kg)  SpO2 98%  BMI 24.02 kg/m2 Estimated body mass index is 24.02 kg/(m^2) as calculated from the following:    Height as of 2/4/17: 5' (1.524 m).    Weight as of this encounter: 123 lb (55.8 kg).  Medication Reconciliation: complete   ................Michael Colby LPN,   March 10, 2017,      1:32 PM,   [facility]

## 2017-03-10 NOTE — MR AVS SNAPSHOT
After Visit Summary   3/10/2017    Maggi Evans    MRN: [id number]           Patient Information     Date Of Birth          5/31/1930        Visit Information        Provider Department      3/10/2017 1:20 PM Gia Khoury APRN [facility]        Today's Diagnoses     Disorientation    -  1    Type 2 diabetes mellitus with hyperglycemia, without long-term current use of insulin (H)          Care Instructions    Labs will be done today.  I will call or send a letter with results within the next 1-2 weeks.      Continue to feed high salt diet and fluid restriction.     Keep diabetes medications the same until I see the A1C result.     We will call you Monday about the labs.         Follow-ups after your visit        Who to contact     If you have questions or need follow up information about today's clinic visit or your schedule please contact [facility] directly at [phone].  Normal or non-critical lab and imaging results will be communicated to you by MyChart, letter or phone within 4 business days after the clinic has received the results. If you do not hear from us within 7 days, please contact the clinic through [employer] or phone. If you have a critical or abnormal lab result, we will notify you by phone as soon as possible.  Submit refill requests through [employer] or call your pharmacy and they will forward the refill request to us. Please allow 3 business days for your refill to be completed.          Additional Information About Your Visit        MyChart Information     [employer] gives you secure access to your electronic health record. If you see a primary care provider, you can also send messages to your care team and make appointments. If you have questions, please call your primary care clinic.  If you do not have a primary care provider, please call [phone] and they will assist you.        Care EveryWhere ID     This is your Care EveryWhere ID. This  could be used by other organizations to access your [place] medical records  [id number]        Your Vitals Were     Pulse Temperature Respirations Pulse Oximetry BMI (Body Mass Index)       90 95.8  F (35.4  C) (Tympanic) 24 98% 24.02 kg/m2        Blood Pressure from Last 3 Encounters:   03/10/17 134/66   02/04/17 (!) 172/96   01/26/17 108/58    Weight from Last 3 Encounters:   03/10/17 123 lb (55.8 kg)   02/04/17 123 lb (55.8 kg)   01/26/17 123 lb (55.8 kg)              We Performed the Following     Basic metabolic panel  (Ca, Cl, CO2, Creat, Gluc, K, Na, BUN)     Hemoglobin A1c        Primary Care Provider Office Phone # Fax #    JORDY Lawson [phone]       [facility] [address] [facility] [address]        Thank you!     Thank you for choosing [facility]  for your care. Our goal is always to provide you with excellent care. Hearing back from our patients is one way we can continue to improve our services. Please take a few minutes to complete the written survey that you may receive in the mail after your visit with us. Thank you!             Your Updated Medication List - Protect others around you: Learn how to safely use, store and throw away your medicines at www.disposemymeds.org.          This list is accurate as of: 3/10/17  1:53 PM.  Always use your most recent med list.                   Brand Name Dispense Instructions for use    acetaminophen 500 MG tablet    TYLENOL     Take 1,000 mg by mouth every 6 hours as needed for mild pain       albuterol (2.5 MG/3ML) 0.083% neb solution     30 vial    Take 1 vial (2.5 mg) by nebulization every 6 hours as needed for shortness of breath / dyspnea or wheezing       amLODIPine 5 MG tablet    NORVASC    31 tablet    TAKE 1 TABLET BY MOUTH EVERY DAY *HIGH BLOOD PRESSURE*       B-D SINGLE USE SWABS REGULAR Pads     100 each    FOR DIABETIC TESTING AS ORDERED       blood glucose calibration solution     1 each     USE TO CALIBRATE BLOOD GLUCOSE MONITOR WHEN STARTING A NEW VIAL OFSTRIPS       blood glucose monitoring lancets     1 Box    USE TO TEST BLOOD SUGAR EVERY DAY OR AS DIRECTED       blood glucose monitoring test strip    ONE TOUCH ULTRA    100 each    by In Vitro route daily       calcium-vitamin D 600-400 MG-UNIT per tablet    CALTRATE    31 tablet    TAKE 1 TABLET BY MOUTH EVERY DAY (CALCIUM DEFICIANCY)       cholecalciferol 400 UNITS tablet    Vitamin D    180 tablet    TAKE 2 TABLETS (800IU) BY M OUTH EVERY DAY       clotrimazole-betamethasone cream    LOTRISONE    60 g    Apply topically 2 times daily       Cranberry 500 MG Caps    CRANBERRY CONCENTRATE    200 capsule    Take 2 capsules (1,000 mg) by mouth 2 times daily       cyanocobalamin 1000 MCG/ML injection    VITAMIN B12     Inject 1 mL into the muscle every 30 days       * eucerin cream     454 g    Use daily at bedtime on feet and twice daily on tailbone       * NO STING BARRIER FILM Misc     1 each    No Sting Barrier Film Spray Apply as directed       Incontinence Brief Medium Misc     1 case    use at bedtime for incontinence       levothyroxine 50 MCG tablet    SYNTHROID/LEVOTHROID    31 tablet    TAKE 1 TABLET BY MOUTH EVER Y DAY       lidocaine 1 % injection     20 mL    ADD 2.1ML TO 1 VIAL OF CEFTRIAXONE FOR INJECTION.       lovastatin 10 MG tablet    MEVACOR     TAKE 1 TAB BY MOUTH AT BEDTIME       metFORMIN 850 MG tablet    GLUCOPHAGE    62 tablet    TAKE 1 TABLET BY MOUTH TWIC E A DAY WITH MEALS       order for [facility]     4 Package    Equipment being ordered: 2 x 2 allevyn gentle foam dressing To be used twice daily to open area       polyethylene glycol powder    MIRALAX    510 g    Take 17 g (1 capful) by mouth every other day       ramipril 5 MG capsule    ALTACE    30 capsule    Take 1 capsule (5 mg) by mouth daily       SENNA-docusate sodium 8.6-50 MG Tabs     30 tablet    TAKE 1 TABLET BY MOUTH EVERY DAY AS NEEDED FOR CONSTIPATION        "silver sulfADIAZINE 1 % cream    SILVADENE    50 g    Apply topically 2 times daily as needed       Syringe/Needle (Disp) 23G X 1-1/2\" 3 ML Misc     10 each    Inject 2 each into the muscle daily For use with rocephin and lidocaine       T.E.D. KNEE LENGTH/S-REGULAR Misc     1 each    1 Package daily       timolol 0.5 % ophthalmic solution    TIMOPTIC     Place 1 drop into both eyes daily       TUSSIN 100 MG/5ML Liqd   Generic drug:  guaiFENesin      Take 10 mLs by mouth every 4 hours as needed for cough       UTI-STAT Liqd      Take 30 mLs by mouth 2 times daily       * Notice:  This list has 2 medication(s) that are the same as other medications prescribed for you. Read the directions carefully, and ask your doctor or other care provider to review them with you.      "

## 2017-03-10 NOTE — PATIENT INSTRUCTIONS
Labs will be done today.  I will call or send a letter with results within the next 1-2 weeks.      Continue to feed high salt diet and fluid restriction.     Keep diabetes medications the same until I see the A1C result.     We will call you Monday about the labs.

## 2017-03-13 NOTE — PROGRESS NOTES
Letter sent to patient informing of recent lab results.  ................Michael Colby LPN,   March 13, 2017,      10:52 AM,   [facility]

## 2017-03-13 NOTE — TELEPHONE ENCOUNTER
Reason for Call:  Request for results:    Name of test or procedure: ua & labs    Date of test of procedure: 03/9 &03/10    Location of the test or procedure: [place] [facility]    OK to leave the result message on voice mail or with a family member? YES    Phone number Patient can be reached at:  Other phone number:  [phone]*    Additional comments: Ludivina would like the results of Maggi's tests and if there are orders that need to be changed they need to be faxed to [phone], Attn: Ludivina    Call taken on 3/13/2017 at 1:45 PM by Kacie Winkler

## 2017-03-20 NOTE — TELEPHONE ENCOUNTER
blood glucose monitoring (ONE TOUCH DELICA) lancets         Last Written Prescription Date: 3/23/16  Last Fill Quantity: 1, # refills: 3  Last Office Visit with G, P or [facility] prescribing provider:  3/10/17        BP Readings from Last 3 Encounters:   03/10/17 134/66   02/04/17 (!) 172/96   01/26/17 108/58     Lab Results   Component Value Date    MICROL 49 12/03/2014     No results found for: MICROALBUMIN  Creatinine   Date Value Ref Range Status   03/10/2017 0.52 0.52 - 1.04 mg/dL Final   ]  GFR Estimate   Date Value Ref Range Status   03/10/2017 >90  Non  GFR Calc   >60 mL/min/1.7m2 Final   02/27/2017 87 >60 mL/min/1.7m2 Final     Comment:     Non  GFR Calc   01/11/2017 88 >60 mL/min/1.7m2 Final     Comment:     Non  GFR Calc     GFR Estimate If Black   Date Value Ref Range Status   03/10/2017 >90   GFR Calc   >60 mL/min/1.7m2 Final   02/27/2017 >90   GFR Calc   >60 mL/min/1.7m2 Final   01/11/2017 >90   GFR Calc   >60 mL/min/1.7m2 Final     Lab Results   Component Value Date    CHOL 123 08/25/2016     Lab Results   Component Value Date    HDL 52 08/25/2016     Lab Results   Component Value Date    LDL 54 08/25/2016    LDL 35 08/30/2011     Lab Results   Component Value Date    TRIG 83 08/25/2016     Lab Results   Component Value Date    CHOLHDLRATIO 1.4 12/30/2014     Lab Results   Component Value Date    AST 16 04/05/2016     Lab Results   Component Value Date    ALT 23 04/05/2016     Lab Results   Component Value Date    A1C 7.0 03/10/2017    A1C 6.6 11/29/2016    A1C 5.9 05/23/2016    A1C 5.9 05/23/2016    A1C 5.9 11/05/2015    A1C 5.9 11/05/2015     Potassium   Date Value Ref Range Status   03/10/2017 4.6 3.4 - 5.3 mmol/L Final

## 2017-03-21 NOTE — TELEPHONE ENCOUNTER
amLODIPine (NORVASC) 5 MG tablet      Last Written Prescription Date: 11/22/16  Last Fill Quantity: 31, # refills: 3    Last Office Visit with [facility], [facility] or [facility] prescribing provider:  3/10/17   Future Office Visit:        BP Readings from Last 3 Encounters:   03/10/17 134/66   02/04/17 (!) 172/96   01/26/17 108/58

## 2017-03-22 NOTE — TELEPHONE ENCOUNTER
LOVASTATIN       Last Written Prescription Date: 1/18/17  Last Fill Quantity: --, # refills: --  HISTORICAL  Last Office Visit with [facility], [facility] or [facility] prescribing provider:  3/10/17   Future Office Visit:      Cholesterol   Date Value Ref Range Status   08/25/2016 123 <200 mg/dL Final     HDL Cholesterol   Date Value Ref Range Status   08/25/2016 52 >49 mg/dL Final     LDL Cholesterol Calculated   Date Value Ref Range Status   08/25/2016 54 <100 mg/dL Final     Comment:     Desirable:       <100 mg/dl     Triglycerides   Date Value Ref Range Status   08/25/2016 83 <150 mg/dL Final     Cholesterol/HDL Ratio   Date Value Ref Range Status   12/30/2014 1.4 0.0 - 5.0 Final     ALT   Date Value Ref Range Status   04/05/2016 23 0 - 50 U/L Final

## 2017-03-22 NOTE — TELEPHONE ENCOUNTER
Prescription approved per [facility] Refill Protocol.    Lenore Villavicencio RN  [facility]

## 2017-03-22 NOTE — TELEPHONE ENCOUNTER
Routing refill request to provider for review/approval because:  Labs out of range:  Elevated BP    Lenore Villavicencio RN  [facility]

## 2017-03-31 NOTE — TELEPHONE ENCOUNTER
Routing refill request to provider for review/approval because:  Drug not on the [facility] refill protocol   Medication is reported/historical    Lenore Villavicencio RN  [facility]

## 2017-03-31 NOTE — TELEPHONE ENCOUNTER
Cranberry-Vitamin C-Inulin (UTI-STAT) LIQD      Last Written Prescription Date: unknown  Last Fill Quantity: unknown,  # refills: unknown   Last Office Visit with [facility], [facility] or [facility] prescribing provider: 03/10/2017    Joanne Flores CMA

## 2017-04-05 NOTE — PROGRESS NOTES
4-5-17  TING authorization form returned to [facility] from member guardian.  CMS will scan into system.  Dolores Bashir RN PHN  [facility]   [phone]

## 2017-04-12 NOTE — MR AVS SNAPSHOT
After Visit Summary   4/12/2017    Maggi Evans    MRN: [id number]           Patient Information     Date Of Birth          5/31/1930        Visit Information        Provider Department      4/12/2017 1:00 PM Shannan Shannon [facility]        Today's Diagnoses     Constipation, unspecified constipation type    -  1    Type 2 diabetes mellitus without complication, without long-term current use of insulin (H)        Chronic hyponatremia        Acquired hypothyroidism        Schizoaffective disorder, unspecified (H)        Urinary tract infection without hematuria, site unspecified        Hypertension goal BP (blood pressure) < 140/90        CAD (coronary artery disease)        Hyperlipidemia LDL goal <100        Nutritional deficiency        Pain        Seasonal allergic rhinitis, unspecified allergic rhinitis trigger        Cognitive impairment - severe, multifactorial (MR, dementia), legally incompetent          Care Instructions    Recommendations from today's MTM visit:                                                        1.  If systolic BPs continue >150mmHg consistently, may consider in future increase in Amlodipine dose to 7.5mg every evening.    2.  MD may consider switch from Metformin 850mg twice daily to Metformin ER 1500mg once daily with breakfast.  Continue to monitor blood sugars and for improvement of diarrhea/fecal incontinence.    3.  Due for thyroid labs.  Orders placed & [facility] to draw labs.  Goal TSH 4-6.    4.   Provider may consider addition of Estradiol cream 0.1mg/gm, place 1gm intravaginally three times weekly for UTI prevention.    5.  Provider may consider discontinuation of scheduled Miralax and instead use Senna-S 1 tablet every 3rd day scheduled and daily as needed.        Next MTM visit: 2 months over the phone, sooner if necessary    To schedule another MTM appointment, please call me directly or you may call the MTM scheduling line at  [phone] or toll-free at [phone].     My Clinical Pharmacist's contact information:                                                      It was a pleasure seeing you today!  Please feel free to contact me with any questions or concerns you have.      Shannan Shannon, Pharm.D.,[facility]  Board Certified Geriatric Pharmacist  Medication Therapy Management Pharmacist  [phone]      You may receive a survey about the [facility] services you received.  I would appreciate your feedback to help me serve you better in the future. Please fill it out and return it when you can. Your comments will be anonymous.                    Follow-ups after your visit        Future tests that were ordered for you today     Open Future Orders        Priority Expected Expires Ordered    Vitamin B12 Routine 4/13/2017 4/12/2018 4/12/2017    TSH with free T4 reflex Routine 4/13/2017 4/12/2018 4/12/2017            Who to contact     If you have questions or need follow up information about today's clinic visit or your schedule please contact [facility] directly at [phone].  Normal or non-critical lab and imaging results will be communicated to you by eSentirehart, letter or phone within 4 business days after the clinic has received the results. If you do not hear from us within 7 days, please contact the clinic through eSent[employer] or phone. If you have a critical or abnormal lab result, we will notify you by phone as soon as possible.  Submit refill requests through [employer] or call your pharmacy and they will forward the refill request to us. Please allow 3 business days for your refill to be completed.          Additional Information About Your Visit        eSentirehart Information     [employer] gives you secure access to your electronic health record. If you see a primary care provider, you can also send messages to your care team and make appointments. If you have questions, please call your primary care clinic.  If you do not have  a primary care provider, please call [phone] and they will assist you.        Care EveryWhere ID     This is your Care EveryWhere ID. This could be used by other organizations to access your [place] medical records  [id number]         Blood Pressure from Last 3 Encounters:   03/10/17 134/66   02/04/17 (!) 172/96   01/26/17 108/58    Weight from Last 3 Encounters:   03/10/17 123 lb (55.8 kg)   02/04/17 123 lb (55.8 kg)   01/26/17 123 lb (55.8 kg)               Primary Care Provider Office Phone # Fax #    Gia JORDY Daniels [phone]       [place] [address] [facility] [address]        Thank you!     Thank you for choosing [facility]  for your care. Our goal is always to provide you with excellent care. Hearing back from our patients is one way we can continue to improve our services. Please take a few minutes to complete the written survey that you may receive in the mail after your visit with us. Thank you!             Your Updated Medication List - Protect others around you: Learn how to safely use, store and throw away your medicines at www.disposemymeds.org.          This list is accurate as of: 4/12/17 11:59 PM.  Always use your most recent med list.                   Brand Name Dispense Instructions for use    acetaminophen 500 MG tablet    TYLENOL     Take 1,000 mg by mouth every 6 hours as needed for mild pain       amLODIPine 5 MG tablet    NORVASC    31 tablet    TAKE 1 TABLET BY MOUTH EVERY DAY *HIGH BLOOD PRESSURE*       B-D SINGLE USE SWABS REGULAR Pads     100 each    FOR DIABETIC TESTING AS ORDERED       blood glucose calibration solution     1 each    USE TO CALIBRATE BLOOD GLUCOSE MONITOR WHEN STARTING A NEW VIAL OFSTRIPS       blood glucose monitoring lancets     1 Box    USE TO TEST BLOOD SUGAR EVERY DAY OR AS DIRECTED       blood glucose monitoring test strip    ONE TOUCH ULTRA    100 each    by In Vitro route daily        calcium-vitamin D 600-400 MG-UNIT per tablet    CALTRATE    31 tablet    TAKE 1 TABLET BY MOUTH EVERY DAY (CALCIUM DEFICIANCY)       cholecalciferol 400 UNITS tablet    Vitamin D    180 tablet    TAKE 2 TABLETS (800IU) BY M OUTH EVERY DAY       Cranberry 500 MG Caps    CRANBERRY CONCENTRATE    200 capsule    Take 2 capsules (1,000 mg) by mouth 2 times daily       cyanocobalamin 1000 MCG/ML injection    VITAMIN B12     Inject 1 mL into the muscle every 30 days       Incontinence Brief Medium Misc     1 case    use at bedtime for incontinence       levothyroxine 50 MCG tablet    SYNTHROID/LEVOTHROID    31 tablet    TAKE 1 TABLET BY MOUTH EVER Y DAY       lovastatin 10 MG tablet    MEVACOR    30 tablet    TAKE 1 TAB BY MOUTH AT BEDTIME       metFORMIN 850 MG tablet    GLUCOPHAGE    62 tablet    TAKE 1 TABLET BY MOUTH TWIC E A DAY WITH MEALS       NO STING BARRIER FILM Misc     1 each    No Sting Barrier Film Spray Apply as directed       order for DME     4 Package    Equipment being ordered: 2 x 2 allevyn gentle foam dressing To be used twice daily to open area       polyethylene glycol powder    MIRALAX    510 g    Take 17 g (1 capful) by mouth every other day       ramipril 5 MG capsule    ALTACE    30 capsule    Take 1 capsule (5 mg) by mouth daily       SENNA-docusate sodium 8.6-50 MG Tabs     30 tablet    TAKE 1 TABLET BY MOUTH EVERY DAY AS NEEDED FOR CONSTIPATION       SUDAFED 24 HOUR PO      Take 2 tablets by mouth every 4 hours as needed       T.E.D. KNEE LENGTH/S-REGULAR Misc     1 each    1 Package daily       timolol 0.5 % ophthalmic solution    TIMOPTIC     Place 1 drop into both eyes daily       TUSSIN 100 MG/5ML Liqd   Generic drug:  guaiFENesin      Take 10 mLs by mouth every 4 hours as needed for cough       UTI-STAT Liqd     887 mL    Take 30 mLs by mouth 2 times daily

## 2017-04-12 NOTE — PROGRESS NOTES
"SUBJECTIVE/OBJECTIVE:                                                    Maggi Evans is an 86 year old female seen virtually in her [facility] for a follow-up visit for Medication Therapy Management.  She was referred to me from [facility].  Maggi's caregivers, Ludivina and another (can't remember her name) were present for visit today as well as [facility]  Dolores Bashir. Consent to communicate on file with both caregivers.  Met with them vs Maggi due to cognitive impairment.  Maggi resides in a group home.    Chief Complaint: Follow up from our visit on 1/2017.    Tobacco: No tobacco use   Alcohol: not currently using  ambulates with a walker in the home; w/c outside of home. H/o frequent falls.  Last on 1/7/17.    Medication Adherence: has assistance setting up med boxes.  Has [facility] services as well.    Hypertension/CAD: Current medications include Amlodipine 5mg qpm, Ramipril 5mg qam.  No longer on ASA due to h/o bleed & PCP preferred to continue without ASA.  Ramipril reduced from 10mg daily following last visit to see if Na level would increase.  Appears this hasn't made a difference.  Uses TEDS stockings; edema well controlled currently per caregiver.  As noted above, h/o falls.  Most recent on 1/7/17 - fell out of a recliner as she was being assisted by caregiver, no injury.  Caregiver has been monitoring BPs and HRs at home twice daily: ranges 133-158/63-93mmHg; typically SBPs when checked at home are >140.  HR 82-108bpm, typically 80s-lower 90s.   Denies dizziness, lightheadedness, chest pain, palpitations.      Diabetes:  Pt currently taking Metformin.  Pt is experiencing the following potential side effects: loose stools/diarrhea. Caregiver describes them as \"blow outs.\"  SMBG: one time daily; alternates times (before bkfst, lunch, or dinner).   Ranges (based on glucometer readings): 119-215mg/dL, typically <200.  The higher readings are usually prior to lunch.  Symptoms of low " "blood sugar? none. Frequency of hypoglycemia? Never  Recent symptoms of high blood sugar? none  Eye exam: up to date - 11/2016  Foot exam: up to date  Microalbumin is not < 30 mg/g. Pt is taking an ACEi/ARB.  Aspirin: no longer on due to h/o JAIMEE.  Diet/Exercise: eats 3 meals daily, 3 snacks - healthy choices.  She does have PT exercises that staff is helping with once daily.      Hypothyroidism: Patient is taking levothyroxine 50 mcg daily. Patient is having the following symptoms: occasional diarrhea.  Last TSH was 11/2015.    UTIs:   Takes Cranberry tabs as well as UTI-Stat for prevention.  Had another urethra dilatation on 12/22/16 following UTIs.   Was on Bactrim for prevention in the past, but this was dc'd due to risk of drug interactions, resistance, safety, etc.  Caregiver reports no signs/sx of UTI currently.  Did finish treatment recently for infection.  Typical presenting symptoms for patient are confusion, \"obsessive\", picks at clothes in addition to cloudy urine and painful urination/subrapubic pain.  Ludivina states urinary and bowel incontinence contribute to this issue.  Pt is toileted every 2hrs, but if she has an accident, she can sit in it at times.    Schizoaffective/dementia:  Diagnosis of this on her problem list.  No longer on Risperidone; tolerated d'c.  Had hoped Na would improve following d'c, but no change.   Mood stable, no reports of being of harm to self or others.    Pain:  Prn Tylenol available.  No pain complaints recently.  H/o hip/leg pain noted in past.     Constipation: Has prn Senna available for use in addition to scheduled Miralax every 3 days.  History of constipation alternating with diarrhea.  As noted above, Ludivina notes \"blow outs\" relatively frequently that she believes can contribute to pt's UTIs.  Requesting d'c of Miralax and switch to Senna.    Supplements:  Currently taking calcium/vit D qd along with plain vit D.  Also on B12 monthly injections.    Hyperlipidemia: " Current therapy includes Lovastatin 10mg once daily.  Dose has been reduced and tolerated; LDL reported below.  Denies complaints of muscle pain/weakness.    SIADH:  H/o low Na.  As noted above, d'c of Risperidone has not improved Na level, and neither has reduction in Ramipril dose.  Pt follows fluid restriction.    Allergies:  Current medication includes prn Tussin, prn Sudafed.  Caregiver denies current symptoms.  Would like to keep them on hand in case they are needed.    Current labs include:  BP Readings from Last 3 Encounters:   03/10/17 134/66   02/04/17 (!) 172/96   01/26/17 108/58     Today's Vitals: There were no vitals taken for this visit.  Lab Results   Component Value Date    A1C 7.0 03/10/2017   .  Lab Results   Component Value Date    CHOL 123 08/25/2016     Lab Results   Component Value Date    TRIG 83 08/25/2016     Lab Results   Component Value Date    HDL 52 08/25/2016     Lab Results   Component Value Date    LDL 54 08/25/2016       Liver Function Studies -   Recent Labs   Lab Test  04/05/16   1640   PROTTOTAL  6.7*   ALBUMIN  2.9*   BILITOTAL  0.2   ALKPHOS  76   AST  16   ALT  23       Lab Results   Component Value Date    UCRR 29 12/03/2014    MICROL 49 12/03/2014    UMALCR 100 05/23/2016       Last Basic Metabolic Panel:  Lab Results   Component Value Date     03/10/2017      Lab Results   Component Value Date    POTASSIUM 4.6 03/10/2017     Lab Results   Component Value Date    CHLORIDE 93 03/10/2017     Lab Results   Component Value Date    BUN 18 03/10/2017     Lab Results   Component Value Date    CR 0.52 03/10/2017     GFR Estimate   Date Value Ref Range Status   03/10/2017 >90  Non  GFR Calc   >60 mL/min/1.7m2 Final   02/27/2017 87 >60 mL/min/1.7m2 Final     Comment:     Non  GFR Calc   01/11/2017 88 >60 mL/min/1.7m2 Final     Comment:     Non  GFR Calc     GFR Estimate If Black   Date Value Ref Range Status   03/10/2017  >90   GFR Calc   >60 mL/min/1.7m2 Final   02/27/2017 >90   GFR Calc   >60 mL/min/1.7m2 Final   01/11/2017 >90   GFR Calc   >60 mL/min/1.7m2 Final     TSH   Date Value Ref Range Status   11/05/2015 1.31 mcU/mL Final   ]    Most Recent Immunizations   Administered Date(s) Administered     Hepatitis B 09/18/2006     Influenza (H1N1) 01/11/2010     Influenza (High Dose) 3 valent vaccine 09/25/2015     Influenza (IIV3) 10/08/2010     Influenza Vaccine IM 3yrs+ 4 Valent IIV4 10/01/2014     Mantoux 10/11/2011     Pneumococcal (PCV 13) 11/23/2015     Pneumococcal 23 valent 10/17/2005     TD (ADULT, 7+) 07/20/2003     TDAP Vaccine (Adacel) 10/29/2012         Current medications were reviewed with her today.      Medication Adherence: no issues identified    Hypertension/CAD:  Patient is typically meeting BP goal of < 150/90mmHg (5 of 15 readings in last week are above 150 systolic, most recent readings are below 150).  This is an appropriate BP goal given her risk for falling & her dementia.   Do not want too tight control due to her increased risk of falls, hypotension, tissue/cerebral hypoperfusion.  Caregiver's BP monitor may likely be reading higher than clinic.  If BPs continue >150 consistently, may consider increase in Amlodipine in future.    Diabetes: Patient is meeting HgA1c goal <8%, and self monitoring of blood glucose is typipcally at goal of <200mg/dL.  HgA1 goal <8% is appropriate goal in this elderly pt with dementia & at risk for side effects & falls.  May be of benefit to consider switch to Metformin ER to allow once daily dosing and reduce risk of side effects diarrhea resulting in incontinence may be due to Metformin.     Hypothyroidism: Last TSH > 1year ago.  Due for recheck.  Best to keep elderly >79yo bewteen TSH range of 4-6 per guidelines to avoid side effects of levothyroxine such as anxiety, diarrhea, low BMD/increased fracture risk,  palpitations/Afib.      UTIs:  May benefit from trial of Estradiol cream for UTI prevention; caregivers would like to try this.  It is appropriate that pt is no longer on Bactrim due to the likelihood that it can contribute to low Na, was no longer offering benefit, and can contribute to g.i. Upset, as well as increased risk of drug interactions (such as with the ramipril: increased risk of hyperkalemia).     Schizoaffective/dementia:  Stable.    Pain:  Stable.  In future, if pt has increased agitation, may benefit from scheduled use of Tylenol vs prn.  Likely difficult for her to communicate when she is uncomfortable, and pain may contribute to mood.  Studies have shown that scheduled use of Tylenol has allowed reduction in use of pscyhotropics.    Constipation:  Consider d'c of Miralax & use Senna every 3rd day and daily as needed.  Monitor for improvement or worsening of symptoms.  Caregivers prefer to try Senna and d'c Miralax.     Supplements:  Most recent B12 level was elevated >1000 back in January 2016.  Would be of benefit to recheck level and if still elevated, consider d'c injection with recheck of B12 lab to determine if supplementation necessary at all.    Hyperlipidemia: Stable.     SIADH:  Most recent Na level continues to be low.  Prefer not to add NaCl supplement at this time since possible it could increase BPs, edema.    Allergies:  Stable.         PLAN:                                                      1.  If systolic BPs continue >150mmHg consistently, may consider in future increase in Amlodipine dose to 7.5mg every evening.    2.  MD may consider switch from Metformin 850mg bid to Metformin ER 1500mg once daily with breakfast.  Continue to monitor blood sugars and for improvement of diarrhea/fecal incontinence.    3.  Due for thyroid labs.  Goal TSH 4-6.  Also suggest Vit B12 lab due to elevated level in January 2016. (orders placed).    4.   Provider may consider addition of Estradiol  cream 0.1mg/gm, place 1gm intravaginally three times weekly for UTI prevention.    5.  Provider may consider d'c of scheduled Miralax and instead use Senna-S 1 tablet every 3rd day scheduled and daily as needed.    I spent 45 minutes with this patient's caregivers today.  A copy of the visit note was provided to the patient's primary care provider.     Will follow up in 8 weeks, sooner if necessary.    The patient's caregiver was e-mailed a summary of these recommendations as an after visit summary as requested.    Shannan Shannon, Pharm.D.,[facility] [place]  Board Certified Geriatric Pharmacist  Medication Therapy Management Pharmacist  [phone]

## 2017-04-12 NOTE — PATIENT INSTRUCTIONS
Recommendations from today's MTM visit:                                                        1.  If systolic BPs continue >150mmHg consistently, may consider in future increase in Amlodipine dose to 7.5mg every evening.    2.  MD may consider switch from Metformin 850mg twice daily to Metformin ER 1500mg once daily with breakfast.  Continue to monitor blood sugars and for improvement of diarrhea/fecal incontinence.    3.  Due for thyroid labs.  Orders placed & [facility] to draw labs.  Goal TSH 4-6.    4.   Provider may consider addition of Estradiol cream 0.1mg/gm, place 1gm intravaginally three times weekly for UTI prevention.    5.  Provider may consider discontinuation of scheduled Miralax and instead use Senna-S 1 tablet every 3rd day scheduled and daily as needed.        Next MTM visit: 2 months over the phone, sooner if necessary    To schedule another MTM appointment, please call me directly or you may call the MTM scheduling line at [phone] or toll-free at [phone].     My Clinical Pharmacist's contact information:                                                      It was a pleasure seeing you today!  Please feel free to contact me with any questions or concerns you have.      Shannan Shannon, Pharm.D.,[facility] [place]  Board Certified Geriatric Pharmacist  Medication Therapy Management Pharmacist  [phone]      You may receive a survey about the MTM services you received.  I would appreciate your feedback to help me serve you better in the future. Please fill it out and return it when you can. Your comments will be anonymous.

## 2017-04-12 NOTE — Clinical Note
Jacqui Nielsen, Please see visit for MTM today.  I did place the thyroid and B12 lab check & home care can do those lab draws.  Please let me know if the recommended med changes can be made, and I can place those orders and notify caregivers if you would like.  Thanks so much!

## 2017-04-13 NOTE — PROGRESS NOTES
"3-12-17  MTM F/u with Shannan via [facility] at [facility].  Member was not present.  Present were Jessica Bowie, [facility] and Shannan via [employer].  See Shannan SILVA notes re medication and lab recommendations.  [facility] contacted Erica RETANA RN with [facility] to inform of lab request.  Marcia and Jessica comfortable and supportive of Shannan recommendations.    Discussion included:  Staff concern with Rocephin effectiness with ongoing recurrent UTI's    (UA obtained via st cath or hat for UTI.  recurrent 3 day treatments - Shannan educated the PCP monitors the culture and sensitivities.  Review s/S of UTI - member typically demonstrates confusion/obcession and picking nato her cloths.  Typically afebrile.  Diabetes discussed A1C 7.0 with goal <8  BS typically <200 checked daily at varieed times before meal  Eats OK - denture fit better, but does not like meat.  Wears Timoteo stocking  Falls almost monthly = last fall 1-7-17 soft fall to floor while getting out of her recliner, no injuries  B/P check BID am and PM  Labs ordered TSH and T44  Increase risk factor due to bowel incontinence.    Option discussed premarin cream to aid and prevent UTI.  Discussion r/t mirolax vs Senna to increase to result in a formed stool vs loose \"blow out\" stools, as member is on a 55 ounce restriction  Plan:  Trial senna qod and prn  Metformin change to XR  Try premarin vag cream and   Ramipril back to 10 mg.  Refer to Shannan Gao note for further details and orders.  Dolores Bashir RN N  [facility]   [phone]      "

## 2017-04-14 NOTE — TELEPHONE ENCOUNTER
Lovastatin     Last Written Prescription Date: 3/22/17  Last Fill Quantity: 30, # refills: 0  Last Office Visit with [facility] or [facility] prescribing provider: 3/10/17       Lab Results   Component Value Date    CHOL 123 08/25/2016     Lab Results   Component Value Date    HDL 52 08/25/2016     Lab Results   Component Value Date    LDL 54 08/25/2016     Lab Results   Component Value Date    TRIG 83 08/25/2016     Lab Results   Component Value Date    CHOLHDLRATIO 1.4 12/30/2014

## 2017-04-14 NOTE — TELEPHONE ENCOUNTER
Prescription approved per [facility] Refill Protocol.    Lenore Villavicencio RN  [facility]

## 2017-04-19 NOTE — TELEPHONE ENCOUNTER
*Different pharmacy*    Cranberry-Vitamin C-Inulin      Last Written Prescription Date:  3/31/17  Last Fill Quantity: 887 g,   # refills: 1  Last Office Visit with [facility], [facility] or [facility] prescribing provider: 3/10/17  Future Office visit:       Routing refill request to provider for review/approval because:  Drug not on the [facility], [facility] or [facility] refill protocol or controlled substance

## 2017-04-20 NOTE — TELEPHONE ENCOUNTER
Called and spoke to Jessica with patient's group home. Informed her of the message below. She understands and agrees with the plan of care.     Lenore Villavicencio RN  [facility]

## 2017-04-20 NOTE — TELEPHONE ENCOUNTER
Please have triage call them and find out what they are requesting?  Her last A1C was good, we wouldn't repeat this until fall.  Find out how she is feeling? If she is feeling okay, it is fine for her blood sugar to occasionally be over 200.     Electronically signed by Gia Khoury CNP.

## 2017-04-20 NOTE — TELEPHONE ENCOUNTER
Called and spoke to Jessica with patient's group home. She said patient's blood sugar has been running higher than normal. She said they are counting carbs and being very strict on her diet. She said her blood sugar numbers are all over the place. She said she ranges from 121-275. Jessica said they have not found a trend on why her numbers are high sometimes. She said patient is feeling fine. Having no symptoms.     Jessica is wondering if Gia thinks they should test more than once a day? She said maybe twice a day or three times a day before meals? She said they would like to figure out when her numbers are high and why. If Gia agrees with increasing the testing times a day, how many times a day and how long should they increase the testing? Jessica said her highest reading was 275 on 4/17/17.     Will route to provider to advise.     Lenore Villavicencio RN  [facility]

## 2017-04-20 NOTE — TELEPHONE ENCOUNTER
Reason for Call:  Other lab    Detailed comments: pt's blood sugars are running over 200 this week    Phone Number Patient can be reached at:     Best Time: anytime    Can we leave a detailed message on this number? YES    Call taken on 4/20/2017 at 12:15 PM by Caty Dillon

## 2017-04-20 NOTE — TELEPHONE ENCOUNTER
As long as she is only on oral medications (Metformin) they only need to test it once a day, in the morning before she eats anything.  As long as it is less than 250, that is fine.  If it is above 250 for several days in a row, we need to know about it. The A1C is a much better indicator of how well her diabetes is controlled and it was good last month (7.0).  We will recheck that again in 6 months.      Electronically signed by Gia Khoury CNP.

## 2017-04-26 NOTE — TELEPHONE ENCOUNTER
Control solution      Last Written Prescription Date:  8/5/15  Last Fill Quantity: 1,   # refills: 3  Last Office Visit with [facility], [facility] or [facility] prescribing provider: 3/10/17  Future Office visit:       Routing refill request to provider for review/approval because:  Drug not on the [facility], [facility] or [facility] refill protocol or controlled substance    BUTTE PASTE      Last Written Prescription Date: ??  Last Fill Quantity: ,  # refills:    Last Office Visit with [facility], [facility] or [facility] prescribing provider: 3/10/17            ................Michael Colby LPN,   April 26, 2017,      5:02 PM,   [facility]

## 2017-04-27 NOTE — PROGRESS NOTES
[facility] now requests orders and shares plan of care/discharge summaries for some patients through "[employer].  Please REPLY TO THIS MESSAGE in order to give authorization for orders when needed.  This is considered a verbal order, you will still receive a faxed copy of orders for signature.  Thank you for your assistance in improving collaboration for our patients.    ORDER                      SN [id number], 5prn    SN to perform assessment with focus on medication management and reconciliation, pain management, mental health status and need for referral or change in treatment plan, /GI status, CVP status, skin integrity, falls risk, and to notify physician for the following clinical findings outside standard parameters. Instruct on disease process, signs/symptoms of complications to report to agency/physician/911, emergency/safety and falls prevention plan, medication effects/SE, new/high risk/changed medications, diet, and activity. Implement interventions to monitor and mitigate pain, prevent pressure ulcers and confirm proper foot care.   7 prn visits for medical symptoms that would necessitate an unplanned visit, supervisory visits per agency protocol, recertification assessments.      Administer Cyanocobalamin 1000 mcg/ml IM every 30 days

## 2017-05-01 NOTE — TELEPHONE ENCOUNTER
Routing refill request to provider for review/approval because:  Medication is reported/historical    Lenore Villavicencio, RN  [facility]

## 2017-05-01 NOTE — TELEPHONE ENCOUNTER
Vitamin B12        Last Written Prescription Date: NA  Last Fill Quantity: NA,    # refills: NA  Last Office Visit with [facility], [facility] or [facility] prescribing provider:  3/10/17      Lab Results   Component Value Date    WBC 8.0 11/29/2016     Lab Results   Component Value Date    RBC 4.22 11/29/2016     Lab Results   Component Value Date    HGB 12.2 11/29/2016     Lab Results   Component Value Date    HCT 35.3 11/29/2016     No components found for: MCT  Lab Results   Component Value Date    MCV 84 11/29/2016     Lab Results   Component Value Date    MCH 28.9 11/29/2016     Lab Results   Component Value Date    MCHC 34.6 11/29/2016     Lab Results   Component Value Date    RDW 13.6 11/29/2016     Lab Results   Component Value Date     11/29/2016     Lab Results   Component Value Date    AST 16 04/05/2016     Lab Results   Component Value Date    ALT 23 04/05/2016     Creatinine   Date Value Ref Range Status   03/10/2017 0.52 0.52 - 1.04 mg/dL Final

## 2017-05-03 NOTE — TELEPHONE ENCOUNTER
There is a standing order for a UA.  She can drop one off.     Electronically signed by Gia Khoury CNP.

## 2017-05-03 NOTE — TELEPHONE ENCOUNTER
Reason for Call: Request for an order or referral:    Order or referral being requested: UA    Date needed: as soon as possible    Has the patient been seen by the PCP for this problem? YES    Additional comments: patients group home staff states that she has a rash on her leg and when she gets a rash like that it usually is a sign of infection. Requesting UA. Patient seems disoriented but not many other sx, would like to rule out a UTI    Phone number Patient can be reached at:  Home number on file [phone]    Best Time:  any    Can we leave a detailed message on this number?  YES    Call taken on 5/3/2017 at 4:06 PM by Lakeisha Crandall

## 2017-05-04 NOTE — TELEPHONE ENCOUNTER
Called and spoke to Jessica with patient's group home. Informed her of the message below. She also wanted to update Gia on the rash. Patient's rash on the leg has gotten better with cream. Jessica said they realized yesterday her soap was changed and the rash started after that. Jessica said they switched her soap and the rash improved. She will bring a urine sample today to rule out UTI since patient seems slightly disoriented.     Will route to Gia as FYFRANK.     Lenore Villavicencio RN  [facility]

## 2017-05-08 NOTE — TELEPHONE ENCOUNTER
Notified caretaker. They need this to be sent to [facility].   Natacha Castro MA     5/8/2017

## 2017-05-08 NOTE — TELEPHONE ENCOUNTER
----- Message from JORDY Lawson CNP sent at 5/7/2017  6:59 PM CDT -----  Please call and advise Maggi will need Rocephin injections again.  1 gram IM daily x 5 days.  Please sandra up orders.     Electronically signed by Gia Khoury CNP.

## 2017-06-01 NOTE — TELEPHONE ENCOUNTER
Vit B 12       Last Written Prescription Date: 5/1/2017  Last Fill Quantity: 1,  # refills: 0   Last Office Visit with [facility], [facility] or [facility] prescribing provider: 3/10/2017

## 2017-06-01 NOTE — PROGRESS NOTES
[facility] now requests orders and shares plan of care/discharge summaries for some patients through [employer].  Please REPLY TO THIS MESSAGE in order to give authorization for orders when needed.  This is considered a verbal order, you will still receive a faxed copy of orders for signature.  Thank you for your assistance in improving collaboration for our patients.    ORDER    Requesting orders for 1 PRN visit to give B12 injection on Monday. B12 injection was not sent to facility. Facility nurse if asking if you can sign multiple scripts so the pharmacy can automatically send the injection when it is due every month.    Veronica De Jesus RN  [phone]  [email or web address]

## 2017-06-02 NOTE — TELEPHONE ENCOUNTER
Vitamin B12  Prescription approved per [facility] Refill Protocol.    Jean Malik RN, BSN

## 2017-06-05 NOTE — PROGRESS NOTES
SUBJECTIVE:                                                    Maggi Evans is a 87 year old female who presents to clinic today for the following health issues:      Joint Pain     Onset: noticed yesterday 6/5/17    Description:   Location: Left big toe   Character: no pain, gray in color and, has neuropathy and is diabetic, and limp, minimal color return    Intensity: no pain    Progression of Symptoms: better, not gray    Accompanying Signs & Symptoms:  Other symptoms: discoloration of toe   History:   Previous similar pain: YES- was told it's been like that for years but not gray and now getting it looked at      Precipitating factors:   Trauma or overuse: no     Alleviating factors:  Improved by: n/a       Therapies Tried and outcome: elevation of feet, was warm not ice cold, med change from metformin to metformin XR    Patient is here in clinic with a caregiver with concern about discoloration and abnormality of her left great toe. Her caregiver notes that yesterday her great toe and the toe next to it were grayish blue in color, there was no known injury and there was no tenderness or other complaints by the patient. Caregiver also notes that the toe is completely limp and patient has no movement or strength to the toe. She is able to walk with a walker and they have not noted any wounds or other abnormalities to the foot. The color in her foot has since normalized and caregiver is unsure how long the weakness has been present.   -------------------------------------    Problem list and histories reviewed & adjusted, as indicated.  Additional history: as documented    BP Readings from Last 3 Encounters:   06/06/17 100/56   03/10/17 134/66   02/04/17 (!) 172/96    Wt Readings from Last 3 Encounters:   03/10/17 123 lb (55.8 kg)   02/04/17 123 lb (55.8 kg)   01/26/17 123 lb (55.8 kg)         Reviewed and updated as needed this visit by clinical staff       Reviewed and updated as needed this visit by  Provider       ROS:  Constitutional, HEENT, cardiovascular, pulmonary, gi and gu systems are negative, except as otherwise noted.    OBJECTIVE:                                                    /56 (BP Location: Left arm, Patient Position: Chair, Cuff Size: Adult Regular)  Pulse 84  Temp 98.3  F (36.8  C) (Temporal)  There is no height or weight on file to calculate BMI.  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 We will follow up as indicated by xray report and I would have them monitor the feet and toes closely for injury or wounds, follow up as needed.     See Patient Instructions    Michelle Johnson PA-C  [facility]

## 2017-06-06 NOTE — NURSING NOTE
Chief Complaint   Patient presents with     Toe Pain     Left big toe issues     Panel Management     CMP, Microalbumin, Foot exam, phq, priyanka       Initial /56 (BP Location: Left arm, Patient Position: Chair, Cuff Size: Adult Regular)  Pulse 84  Temp 98.3  F (36.8  C) (Temporal) Estimated body mass index is 24.02 kg/(m^2) as calculated from the following:    Height as of 2/4/17: 5' (1.524 m).    Weight as of 3/10/17: 123 lb (55.8 kg).  Medication Reconciliation: yaa Garcia, CMA

## 2017-06-06 NOTE — MR AVS SNAPSHOT
After Visit Summary   6/6/2017    Maggi Evans    MRN: [id number]           Patient Information     Date Of Birth          5/31/1930        Visit Information        Provider Department      6/6/2017 10:00 AM Michelle Johnson PA-C [facility]        Today's Diagnoses     Toe anomaly    -  1      Care Instructions    We will contact you with results of xray's and further recommendations, Due to laxity of the toe joint monitor and ensure there are not rubbed or breakdown areas and that there is not cut off of blood supply by positioning.           Follow-ups after your visit        Follow-up notes from your care team     Return if symptoms worsen or fail to improve.      Who to contact     If you have questions or need follow up information about today's clinic visit or your schedule please contact [facility] directly at [phone].  Normal or non-critical lab and imaging results will be communicated to you by MyChart, letter or phone within 4 business days after the clinic has received the results. If you do not hear from us within 7 days, please contact the clinic through [employer] or phone. If you have a critical or abnormal lab result, we will notify you by phone as soon as possible.  Submit refill requests through [employer] or call your pharmacy and they will forward the refill request to us. Please allow 3 business days for your refill to be completed.          Additional Information About Your Visit        MyChart Information     [employer] gives you secure access to your electronic health record. If you see a primary care provider, you can also send messages to your care team and make appointments. If you have questions, please call your primary care clinic.  If you do not have a primary care provider, please call [phone] and they will assist you.        Care EveryWhere ID     This is your Care EveryWhere ID. This could be used by other organizations to access  your [place] medical records  [id number]        Your Vitals Were     Pulse Temperature                84 98.3  F (36.8  C) (Temporal)           Blood Pressure from Last 3 Encounters:   06/06/17 100/56   03/10/17 134/66   02/04/17 (!) 172/96    Weight from Last 3 Encounters:   03/10/17 123 lb (55.8 kg)   02/04/17 123 lb (55.8 kg)   01/26/17 123 lb (55.8 kg)               Primary Care Provider Office Phone # Fax #    GiaJORDY Solis [phone]       [employer] [address] [facility] [address]        Thank you!     Thank you for choosing [facility]  for your care. Our goal is always to provide you with excellent care. Hearing back from our patients is one way we can continue to improve our services. Please take a few minutes to complete the written survey that you may receive in the mail after your visit with us. Thank you!             Your Updated Medication List - Protect others around you: Learn how to safely use, store and throw away your medicines at www.disposemymeds.org.          This list is accurate as of: 6/6/17 10:33 AM.  Always use your most recent med list.                   Brand Name Dispense Instructions for use    acetaminophen 500 MG tablet    TYLENOL     Take 1,000 mg by mouth every 6 hours as needed for mild pain       amLODIPine 5 MG tablet    NORVASC    31 tablet    TAKE 1 TABLET BY MOUTH EVERY DAY *HIGH BLOOD PRESSURE*       B-D SINGLE USE SWABS REGULAR Pads     100 each    FOR DIABETIC TESTING AS ORDERED       betamethasone dipropionate 0.05 % cream    DIPROSONE    30 g    APPLY TO AFFECTED AREA(S) TWICE DAILY AS NEEDED--APPLY WITH CLOTRIMAZOLE CR       blood glucose calibration solution     1 each    USE TO CALIBRATE BLOOD GLUCOSE MONITOR WHEN STARTING A NEW VIAL OFSTRIPS       blood glucose monitoring lancets     1 Box    USE TO TEST BLOOD SUGAR EVERY DAY OR AS DIRECTED       blood glucose monitoring test strip    ONE TOUCH ULTRA    100  "each    by In Vitro route daily       calcium-vitamin D 600-400 MG-UNIT per tablet    CALTRATE    31 tablet    TAKE 1 TABLET BY MOUTH EVERY DAY (CALCIUM DEFICIANCY)       cholecalciferol 400 UNITS tablet    Vitamin D    180 tablet    TAKE 2 TABLETS (800IU) BY M OUTH EVERY DAY       clotrimazole 1 % cream    LOTRIMIN    30 g    APPLY TO AFFECTED AREA(S) TWICE DAILY AS NEEDED-APPLY WITH BETAMETHASONE       clotrimazole-betamethasone cream    LOTRISONE    45 g    APPLY TO AFFECTED AREA(S) TWICE DAILY AS NEEDED       Cranberry 500 MG Caps    CRANBERRY CONCENTRATE    200 capsule    Take 2 capsules (1,000 mg) by mouth 2 times daily       cyanocobalamin 1000 MCG/ML injection    VITAMIN B12    1 mL    INJECT 1ML (1000MCG)IM ONCE A MONTH       Incontinence Brief Medium Misc     1 case    use at bedtime for incontinence       levothyroxine 50 MCG tablet    SYNTHROID/LEVOTHROID    31 tablet    TAKE 1 TABLET BY MOUTH EVER Y DAY       lovastatin 10 MG tablet    MEVACOR    30 tablet    TAKE 1 TABLET BY MOUTH AT BEDTIME       metFORMIN 500 MG 24 hr tablet    GLUCOPHAGE-XR    30 tablet    Take 2 tablets (1,000 mg) by mouth daily (with dinner)       NO STING BARRIER FILM Misc     1 each    No Sting Barrier Film Spray Apply as directed       order for [facility]     4 Package    Equipment being ordered: 2 x 2 allevyn gentle foam dressing To be used twice daily to open area       polyethylene glycol powder    MIRALAX    510 g    Take 17 g (1 capful) by mouth every other day       ramipril 5 MG capsule    ALTACE    30 capsule    Take 1 capsule (5 mg) by mouth daily       SENEXON-S 8.6-50 MG per tablet   Generic drug:  senna-docusate     30 tablet    TAKE 1 TABLET BY MOUTH EVERY 3RD DAY       SUDAFED 24 HOUR PO      Take 2 tablets by mouth every 4 hours as needed       Syringe/Needle (Disp) 23G X 1-1/2\" 3 ML Misc     10 each    Inject 2 each into the muscle daily For use with rocephin and lidocaine       T.E.D. KNEE LENGTH/S-REGULAR Misc     1 " each    1 Package daily       timolol 0.5 % ophthalmic solution    TIMOPTIC     Place 1 drop into both eyes daily       TUSSIN 100 MG/5ML Liqd   Generic drug:  guaiFENesin      Take 10 mLs by mouth every 4 hours as needed for cough       UNABLE TO FIND     1 each    MEDICATION NAME: BUTTE PASTE Apply as needed.       UTI-STAT Liqd     887 mL    TAKE 30ML'S BEFORE USING MOUTH 2 TIMES DAILY

## 2017-06-06 NOTE — PATIENT INSTRUCTIONS
We will contact you with results of xray's and further recommendations, Due to laxity of the toe joint monitor and ensure there are not rubbed or breakdown areas and that there is not cut off of blood supply by positioning.

## 2017-06-12 NOTE — NURSING NOTE
Chief Complaint   Patient presents with     Urinary Problem     not feeling well, blood sugars high, stomach ache       Initial /62  Pulse 102  Temp 97.6  F (36.4  C) (Tympanic)  Resp 20  Wt 119 lb (54 kg)  SpO2 98%  Breastfeeding? No  BMI 23.24 kg/m2 Estimated body mass index is 23.24 kg/(m^2) as calculated from the following:    Height as of 2/4/17: 5' (1.524 m).    Weight as of this encounter: 119 lb (54 kg).  Medication Reconciliation: complete   ................Michael Colby LPN,   June 12, 2017,      3:37 PM,   [facility]

## 2017-06-12 NOTE — PATIENT INSTRUCTIONS
Bring back the urine sample.    We won't change any diabetes medications right now. I think her elevated blood sugars are due to her being ill right now.

## 2017-06-12 NOTE — MR AVS SNAPSHOT
After Visit Summary   6/12/2017    Maggi Evans    MRN: [id number]           Patient Information     Date Of Birth          5/31/1930        Visit Information        Provider Department      6/12/2017 2:00 PM Gia Khoury APRN CNP [facility]        Today's Diagnoses     Altered mental status, unspecified altered mental status type    -  1      Care Instructions    Bring back the urine sample.    We won't change any diabetes medications right now. I think her elevated blood sugars are due to her being ill right now.               Follow-ups after your visit        Future tests that were ordered for you today     Open Future Orders        Priority Expected Expires Ordered    *UA reflex to Microscopic and Culture ([place] [facility] (except [place] and [place]) Routine  6/12/2018 6/12/2017            Who to contact     If you have questions or need follow up information about today's clinic visit or your schedule please contact [facility] directly at [phone].  Normal or non-critical lab and imaging results will be communicated to you by [employer], letter or phone within 4 business days after the clinic has received the results. If you do not hear from us within 7 days, please contact the clinic through [employer] or phone. If you have a critical or abnormal lab result, we will notify you by phone as soon as possible.  Submit refill requests through [employer] or call your pharmacy and they will forward the refill request to us. Please allow 3 business days for your refill to be completed.          Additional Information About Your Visit        MyChart Information     [employer] gives you secure access to your electronic health record. If you see a primary care provider, you can also send messages to your care team and make appointments. If you have questions, please call your primary care clinic.  If you do not have a primary care provider, please call [phone]  and they will assist you.        Care EveryWhere ID     This is your Care EveryWhere ID. This could be used by other organizations to access your [place] medical records  [id number]         Blood Pressure from Last 3 Encounters:   06/06/17 100/56   03/10/17 134/66   02/04/17 (!) 172/96    Weight from Last 3 Encounters:   03/10/17 123 lb (55.8 kg)   02/04/17 123 lb (55.8 kg)   01/26/17 123 lb (55.8 kg)               Primary Care Provider Office Phone # Fax #    JORDY Lawson [phone]       [facility] [address] [facility] [address]        Thank you!     Thank you for choosing [facility]  for your care. Our goal is always to provide you with excellent care. Hearing back from our patients is one way we can continue to improve our services. Please take a few minutes to complete the written survey that you may receive in the mail after your visit with us. Thank you!             Your Updated Medication List - Protect others around you: Learn how to safely use, store and throw away your medicines at www.disposemymeds.org.          This list is accurate as of: 6/12/17  2:33 PM.  Always use your most recent med list.                   Brand Name Dispense Instructions for use    acetaminophen 500 MG tablet    TYLENOL     Take 1,000 mg by mouth every 6 hours as needed for mild pain       amLODIPine 5 MG tablet    NORVASC    31 tablet    TAKE 1 TABLET BY MOUTH EVERY DAY *HIGH BLOOD PRESSURE*       B-D SINGLE USE SWABS REGULAR Pads     100 each    FOR DIABETIC TESTING AS ORDERED       betamethasone dipropionate 0.05 % cream    DIPROSONE    30 g    APPLY TO AFFECTED AREA(S) TWICE DAILY AS NEEDED--APPLY WITH CLOTRIMAZOLE CR       blood glucose calibration solution     1 each    USE TO CALIBRATE BLOOD GLUCOSE MONITOR WHEN STARTING A NEW VIAL OFSTRIPS       blood glucose monitoring lancets     1 Box    USE TO TEST BLOOD SUGAR EVERY DAY OR AS DIRECTED       blood glucose  "monitoring test strip    ONE TOUCH ULTRA    100 each    by In Vitro route daily       calcium-vitamin D 600-400 MG-UNIT per tablet    CALTRATE    31 tablet    TAKE 1 TABLET BY MOUTH EVERY DAY (CALCIUM DEFICIANCY)       cholecalciferol 400 UNITS tablet    Vitamin D    180 tablet    TAKE 2 TABLETS (800IU) BY M OUTH EVERY DAY       clotrimazole 1 % cream    LOTRIMIN    30 g    APPLY TO AFFECTED AREA(S) TWICE DAILY AS NEEDED-APPLY WITH BETAMETHASONE       clotrimazole-betamethasone cream    LOTRISONE    45 g    APPLY TO AFFECTED AREA(S) TWICE DAILY AS NEEDED       Cranberry 500 MG Caps    CRANBERRY CONCENTRATE    200 capsule    Take 2 capsules (1,000 mg) by mouth 2 times daily       cyanocobalamin 1000 MCG/ML injection    VITAMIN B12    1 mL    INJECT 1ML (1000MCG)IM ONCE A MONTH       Incontinence Brief Medium Misc     1 case    use at bedtime for incontinence       levothyroxine 50 MCG tablet    SYNTHROID/LEVOTHROID    31 tablet    TAKE 1 TABLET BY MOUTH EVER Y DAY       lovastatin 10 MG tablet    MEVACOR    30 tablet    TAKE 1 TABLET BY MOUTH AT BEDTIME       metFORMIN 500 MG 24 hr tablet    GLUCOPHAGE-XR    56 tablet    TAKE 2 TABLETS BY MOUTH ONCE DAILY       NO STING BARRIER FILM Misc     1 each    No Sting Barrier Film Spray Apply as directed       order for [facility]     4 Package    Equipment being ordered: 2 x 2 allevyn gentle foam dressing To be used twice daily to open area       polyethylene glycol powder    MIRALAX    510 g    Take 17 g (1 capful) by mouth every other day       ramipril 5 MG capsule    ALTACE    30 capsule    Take 1 capsule (5 mg) by mouth daily       SENEXON-S 8.6-50 MG per tablet   Generic drug:  senna-docusate     30 tablet    TAKE 1 TABLET BY MOUTH EVERY 3RD DAY       SUDAFED 24 HOUR PO      Take 2 tablets by mouth every 4 hours as needed       Syringe/Needle (Disp) 23G X 1-1/2\" 3 ML Misc     10 each    Inject 2 each into the muscle daily For use with rocephin and lidocaine       T.E.D. KNEE " LENGTH/S-REGULAR Misc     1 each    1 Package daily       timolol 0.5 % ophthalmic solution    TIMOPTIC     Place 1 drop into both eyes daily       TUSSIN 100 MG/5ML Liqd   Generic drug:  guaiFENesin      Take 10 mLs by mouth every 4 hours as needed for cough       UNABLE TO FIND     1 each    MEDICATION NAME: BUTTE PASTE Apply as needed.       UTI-STAT Liqd     887 mL    TAKE 30ML'S BEFORE USING MOUTH 2 TIMES DAILY

## 2017-06-12 NOTE — PROGRESS NOTES
"  SUBJECTIVE:                                                    Maggi Evans is a 87 year old female who presents to clinic today for the following health issues:      URINARY TRACT SYMPTOMS      Duration: unknown    Description  \"Not feeling well\", Stomach ache, confusion - worse than usual.     Intensity:  mild    Accompanying signs and symptoms:  Fever/chills: no   Flank pain no   Nausea and vomiting: no   Vaginal symptoms: none  Abdominal/Pelvic Pain: YES- stomach ache    History  History of frequent UTI's: YES  History of kidney stones: no   Sexually Active: no     Precipitating or alleviating factors: None    Therapies tried and outcome: rocephin one month ago     No fevers/chills.    Frequent UTIs in the past - typically present as confusion.   Her blood sugars have been a little bit high yesterday - 246, 276, 247, 268.  Today's was 152 before breakfast.     Problem list and histories reviewed & adjusted, as indicated.  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)     Dermatophytosis of nail     At risk for falling     Cough     Edema extremities     Skin breakdown     Drug ingestion, accidental     History of recurrent UTIs - last Dec 2014 E coli     Mitral regurgitation     History of skin cancer     Mild major depression (H)     CAD (coronary artery disease)     Health Care Home     UTI (urinary tract infection)     Type 2 diabetes mellitus with diabetic neuropathy     Ovarian cyst     " "Constipation, unspecified constipation type     Acute posthemorrhagic anemia     Anemia     Schizoaffective disorder, unspecified (H)     Hypothyroidism     Dysuria     Laceration of scalp without foreign body     Chronic hyponatremia     Anemia due to blood loss, acute     External hemorrhoids with complication     Type 2 diabetes mellitus without complication, without long-term current use of insulin (H)     Past Surgical History:   Procedure Laterality Date     ANKLE SURGERY       COLONOSCOPY  05/18/09     HC REMV CATARACT EXTRACAP,INSERT LENS  02/20/2003    left     HC REMV CATARACT EXTRACAP,INSERT LENS  1/16/2003    right eye       Social History   Substance Use Topics     Smoking status: Never Smoker     Smokeless tobacco: Never Used     Alcohol use No     Family History   Problem Relation Age of Onset     Cardiovascular Mother      DIABETES Mother      CEREBROVASCULAR DISEASE Brother      Neurologic Disorder Brother      HEART DISEASE Sister      Breast Cancer Sister          Current Outpatient Prescriptions   Medication Sig Dispense Refill     metFORMIN (GLUCOPHAGE-XR) 500 MG 24 hr tablet TAKE 2 TABLETS BY MOUTH ONCE DAILY 56 tablet 3     cyanocobalamin (VITAMIN B12) 1000 MCG/ML injection INJECT 1ML (1000MCG)IM ONCE A MONTH 1 mL 4     clotrimazole (LOTRIMIN) 1 % cream APPLY TO AFFECTED AREA(S) TWICE DAILY AS NEEDED-APPLY WITH BETAMETHASONE 30 g 0     betamethasone dipropionate (DIPROSONE) 0.05 % cream APPLY TO AFFECTED AREA(S) TWICE DAILY AS NEEDED--APPLY WITH CLOTRIMAZOLE CR 30 g 0     ramipril (ALTACE) 5 MG capsule Take 1 capsule (5 mg) by mouth daily 30 capsule 3     levothyroxine (SYNTHROID/LEVOTHROID) 50 MCG tablet TAKE 1 TABLET BY MOUTH EVER Y DAY 31 tablet 3     Cranberry (CRANBERRY CONCENTRATE) 500 MG CAPS Take 2 capsules (1,000 mg) by mouth 2 times daily 200 capsule 6     SENEXON-S 8.6-50 MG per tablet TAKE 1 TABLET BY MOUTH EVERY 3RD DAY 30 tablet 1     Syringe/Needle, Disp, 23G X 1-1/2\" 3 ML MISC " Inject 2 each into the muscle daily For use with rocephin and lidocaine 10 each 0     clotrimazole-betamethasone (LOTRISONE) cream APPLY TO AFFECTED AREA(S) TWICE DAILY AS NEEDED 45 g 0     blood glucose calibration (ONETOUCH ULTRA CONTROL) solution USE TO CALIBRATE BLOOD GLUCOSE MONITOR WHEN STARTING A NEW VIAL OFSTRIPS 1 each 3     UNABLE TO FIND MEDICATION NAME: BUTTE PASTE  Apply as needed. 1 each 11     Cranberry-Vitamin C-Inulin (UTI-STAT) LIQD TAKE 30ML'S BEFORE USING MOUTH 2 TIMES DAILY 887 mL 0     lovastatin (MEVACOR) 10 MG tablet TAKE 1 TABLET BY MOUTH AT BEDTIME 30 tablet 5     Pseudoephedrine HCl (SUDAFED 24 HOUR PO) Take 2 tablets by mouth every 4 hours as needed       blood glucose monitoring (ONE TOUCH DELICA) lancets USE TO TEST BLOOD SUGAR EVERY DAY OR AS DIRECTED 1 Box 1     amLODIPine (NORVASC) 5 MG tablet TAKE 1 TABLET BY MOUTH EVERY DAY *HIGH BLOOD PRESSURE* 31 tablet 3     Alcohol Swabs (B-D SINGLE USE SWABS REGULAR) PADS FOR DIABETIC TESTING AS ORDERED 100 each 3     cholecalciferol (VITAMIN D) 400 UNITS tablet TAKE 2 TABLETS (800IU) BY M OUTH EVERY  tablet 1     blood glucose monitoring (ONE TOUCH ULTRA) test strip by In Vitro route daily 100 each prn     acetaminophen (TYLENOL) 500 MG tablet Take 1,000 mg by mouth every 6 hours as needed for mild pain       guaiFENesin (TUSSIN) 100 MG/5ML LIQD Take 10 mLs by mouth every 4 hours as needed for cough       Skin Protectants, Misc. (NO STING BARRIER FILM) MISC No Sting Barrier Film Spray Apply as directed 1 each 11     calcium-vitamin D (CALTRATE) 600-400 MG-UNIT per tablet TAKE 1 TABLET BY MOUTH EVERY DAY (CALCIUM DEFICIANCY) 31 tablet 11     Elastic Bandages & Supports (T.E.D. KNEE LENGTH/S-REGULAR) MISC 1 Package daily 1 each 2     polyethylene glycol (MIRALAX) powder Take 17 g (1 capful) by mouth every other day 510 g 1     ORDER FOR DME Equipment being ordered: 2 x 2 allevyn gentle foam dressing  To be used twice daily to open area 4  "Package 6     timolol (TIMOPTIC) 0.5 % ophthalmic solution Place 1 drop into both eyes daily        INCONTINENCE BRIEF MEDIUM MISC use at bedtime for incontinence 1 case prn     Allergies   Allergen Reactions     Adhesive Tape Rash     rash--also metal   Pt states bandaids are OK     Penicillins Rash     rash     Liquid Adhesive Rash     BP Readings from Last 3 Encounters:   06/12/17 120/62   06/06/17 100/56   03/10/17 134/66    Wt Readings from Last 3 Encounters:   06/12/17 119 lb (54 kg)   03/10/17 123 lb (55.8 kg)   02/04/17 123 lb (55.8 kg)                    Reviewed and updated as needed this visit by clinical staff  Tobacco  Allergies  Meds  Med Hx  Surg Hx  Fam Hx  Soc Hx      Reviewed and updated as needed this visit by Provider         ROS:  CONSTITUTIONAL:POSITIVE  for increased confusion and NEGATIVE  for fever   E/M: NEGATIVE for ear, mouth and throat problems  R: NEGATIVE for significant cough or SOB  CV: NEGATIVE for chest pain, palpitations or peripheral edema  GI: POSITIVE for \"stomach ache\" as above  and NEGATIVE for constipation, heartburn or reflux, nausea, vomiting and weight loss  : NEGATIVE for dysuria, hematuria, flank pain     OBJECTIVE:                                                    /62  Pulse 102  Temp 97.6  F (36.4  C) (Tympanic)  Resp 20  Wt 119 lb (54 kg)  SpO2 98%  Breastfeeding? No  BMI 23.24 kg/m2  Body mass index is 23.24 kg/(m^2).  GENERAL: alert, no distress, elderly and mentation appears normal for her based on my previous experiences with her  NECK: no adenopathy, no asymmetry, masses, or scars and thyroid normal to palpation  RESP: lungs clear to auscultation - no rales, rhonchi or wheezes  CV: regular rate and rhythm, normal S1 S2, no S3 or S4, no murmur, click or rub, no peripheral edema and peripheral pulses strong  ABDOMEN: soft, nontender, no hepatosplenomegaly, no masses and bowel sounds normal  MS: no gross musculoskeletal defects noted, no " edema    Diagnostic Test Results:  Pending      ASSESSMENT/PLAN:                                                        1. Altered mental status, unspecified altered mental status type  Will have them bring back a UA as she was unable to go in clinic today.  Will likely wait for the culture to treat.   - *UA reflex to Microscopic and Culture ([place] [facility] (except [place] and [place]); Future    2. Hyperglycemia  Likely related to acute illness.  Last A1C was 7.0. No changes with diabetes treatment at this point.       See Patient Instructions    JORDY Lawson [facility]

## 2017-06-13 NOTE — PROGRESS NOTES
6-13-17   Home visit/[facility] Risk Assessment done at client day centers.  Present as client guardian/nephew, [facility] manager Keira [employer] , Sabrina Franklin DD worker, Jessica Medina - [facility]  and CC.    CC is taking over CM for previous CM Dolores Bashir RN so CC introduce herself to all those in attendance today and contact information was shared.    Member resides: [facility] in [place] and she has lived there for years.    Member currently receiving the following services:  Client received 24/7 support from [facility] staff. She does go to 'work' at the [facility] [employer] 3 times per week and all present still feel this is very important to her.  She does like her days at home, but work days will continue unless those involved feel it is too much for client.   Client also receives RN services from [facility] monthly to B-12 injection, foot care and to monitor skin integrity and address any concerns if needed.    See EMR for a list of client's diagnoses and medications.   Medication management: Medications reviewed:Yes. Medication management: Care giver administers medication. Medication understanding:Caregiver has understanding of regimen and is able to complete med set up/administration Yes.  Medication not updated in EPIC due to client just being at the clinic for PCP visit yesterday    Member Mood/behavior-PHQ9 score: unable to determine  Any needs to f/u with PCP and staff involved with client will continue to assess and monitor and reach out to PCP if there are any concerns or changes.    Plan of Care:Continue with services that are already in place at this time.    Follow-Up Plan: Member informed of future contact, plan to f/u with member with a 6 month telephone assessment.  Contact information shared with member and family, encouraged member to call with any questions or concerns prior to this.  See [facility] for further detailed information  Honey Borreog MA [facility] Care Coordinator   [phone]

## 2017-06-15 NOTE — TELEPHONE ENCOUNTER
Maggi Evans is a 87 year old female who is called at the request of provider to see how pt is doing.  Spoke with Jessica pt's coordinator.  Relayed provider's message:    Notes Recorded by Gia Khoury APRN CNP on 6/15/2017 at 7:03 AM   Please call and find out how she is doing.  Her urine culture is not back yet, but the preliminary report shows very few bacteria.      Electronically signed by Gia Khoury CNP.  Caregiver states that pt has still been confused, is more argumentative, increased agitation and is picking--so no improvements in behavior.  States pt is eating and drinking normally, and blood sugar was 171 today.  Denies any loss of balance, stomach/back pain, fever, nausea/vomiting, diarrhea or blood in urine.  Asks if increased WBC indicates a kidney infection.  Also states she is wondering if pt is just having worsening dementia or pt needs her sodium levels checked.  Reports that pt doesn't absorb sodium and she is concerned she could be low.  States that if pt needs labs, wondering if we can call to let [place] hospice nurse know.    NURSING ASSESSMENT:  Description:  Increased behaviors  Onset/duration:  Unknown, since Monday  Precip. factors:  Hx of freq UTIs, dementia  Associated symptoms:  See above  Improves/worsens symptoms:  No improvement  Pain scale (0-10)   Unable to assess, no complaints of pain  LMP/preg/breast feeding:  NA  Last exam/Treatment:  6/12/17  Allergies:   Allergies   Allergen Reactions     Adhesive Tape Rash     rash--also metal   Pt states bandaids are OK     Penicillins Rash     rash     Liquid Adhesive Rash       NURSING PLAN: Routed to provider Yes    RECOMMENDED DISPOSITION:  Routing to Gia Khoury CNP for further recommendations.  Will comply with recommendation: N/A  If further questions/concerns or if symptoms do not improve, worsen or new symptoms develop, call your PCP or [facility] as soon as possible.      Guideline used: Urination,  Painful  Telephone Triage Protocols for Nurses, Fifth Edition, Lizy Malik RN

## 2017-06-15 NOTE — TELEPHONE ENCOUNTER
Telephone call to Erica, HC nurse with HC.  Erica will go out to draw BMP lab.  Did get verbal from [facility] that Chandra Clarkon is guardian, and she is going to fax over guardianship paperwork.  Got verbal consent from marci Whitley to give Jessica at [facility] plan for pt.  Telephone call back to Jessica, relayed provider's message.  Also sending consent to communicate out to Chandra to be able to speak with Jessica.    Jean Malik RN, BSN

## 2017-06-15 NOTE — PROGRESS NOTES
Clinic Care Coordination Contact 6/15/17  Care Team Conversations-SW    Received a phone call from RN asking to assist her with determining who staff can contact for the pt. Pt lives in a  and has a guardian. Pt completed a consent to communicate for two staff from her group home. RN was wondering if she could communicate with  staff or Guardian without the documentation on file. Writer encouraged her to call risk management with questions like this and advised that she could call  and inquire if they had a TING or guardianship paper work they could share with us, etc. Pt also has home care who could be of assistance to determine who is a contact for the pt.    Dodie Fernández, [facility]  Care Coordinator Social Work    [facility] [employer]  [phone]  6/16/2017 12:42 PM

## 2017-06-15 NOTE — TELEPHONE ENCOUNTER
Have her do a BMP.  Can the hospice nurse draw that?    Order is placed.     Electronically signed by Gia Khoury CNP.

## 2017-06-16 NOTE — TELEPHONE ENCOUNTER
Discussed with home care and they cannot get this medication until Monday, Per Gia Khoury ok to wait.  Natacha Castro MA     6/16/2017

## 2017-06-16 NOTE — TELEPHONE ENCOUNTER
Please call and advise home care staff that her sodium level is normal.  If her symptoms have not yet improved, I will start an antibiotic for possible urinary tract infection.     Electronically signed by Gia Khoury CNP.

## 2017-06-16 NOTE — TELEPHONE ENCOUNTER
Telephone call back to Jessica pt's nurse.  Relayed provider's message.  States pt has not had any improvement in symptoms.  Pharmacy of choice added to orders screen.  Also Jessica states pt is going to be having more skin cancer removed from her scalp at dermatology clinic on 6/29/17.  Routing back to Gia Khoury CNP.    Jean Malik RN, BSN

## 2017-06-16 NOTE — TELEPHONE ENCOUNTER
Rocephin sent over to pharmacy.  Please call and advise.     Electronically signed by Gia Khoury CNP.

## 2017-06-19 NOTE — PROGRESS NOTES
[facility] now requests orders and shares plan of care/discharge summaries for some patients through [facility].  Please REPLY TO THIS MESSAGE in order to give authorization for orders when needed.  This is considered a verbal order, you will still receive a faxed copy of orders for signature.  Thank you for your assistance in improving collaboration for our patients.      This 86 yo female who is incontinent of urine and involuntary of stool, has has chronic UTIs, UTI tx with 5 daysof rocephin on 12/21/16, 1/31/17, 3/2/17, 4/4/17 ,5/9/17, and now on 6/19/17. Would you consider treating her pophylactically with  Oral ABX ?

## 2017-06-20 NOTE — PROGRESS NOTES
Home care nurse called back wondering what to do. Would you like to start her on this oral antibiotic? Please call and advise. Is aware that he is not in the office today. Is OK with waiting until tomorrow. Please call Erica with home care [phone]  Thank you,  Stephania Maloney   for [facility]

## 2017-06-20 NOTE — TELEPHONE ENCOUNTER
Jacqui Khoury,  Please see MTM visit from 4/12/17 regarding recommendation to consider addition of Estradiol vaginal cream for UTI prevention.  May be of benefit in this patient with h/o recurrent UTI.    Shannan Shannon, Pharm.D.,[facility] [place]  Board Certified Geriatric Pharmacist  Medication Therapy Management Pharmacist  [phone]

## 2017-06-21 NOTE — PROGRESS NOTES
I have sent over a prescription for a low dose of Cephalexin for her to take daily for UTI prevention.  Please start that after the injections are done.     Electronically signed by Gia Khoury CNP.

## 2017-06-21 NOTE — PROGRESS NOTES
Per CM request, rightfaxed POLST to Brooklyn Salinas with [employer] fax number [phone]    Emily Digato  Case Management Specialist   [facility]   [phone]

## 2017-06-22 NOTE — TELEPHONE ENCOUNTER
Reason for call:  Patient reporting a symptom    Symptom or request: rash developed on day 4 of Rocephin treatment. Tx for UTI     Duration (how long have symptoms been present): has had rash before, but is brighter red, and patient states is not feeling well.     Have you been treated for this before? NA    Additional comments: Homecare nurse Erica states will hold two remaining doses, and will list as an allergy.  States will begin Cefelexin on Saturday as a  profilactin.  Erica can be reached [phone] if needed.     Phone Number patient can be reached at:  Home number on file [phone] (home)    Best Time:  No call back requested, just FYI.     Can we leave a detailed message on this number:  Not Applicable    Call taken on 6/22/2017 at 8:17 AM by Sarita Keene

## 2017-06-22 NOTE — PROGRESS NOTES
"  SUBJECTIVE:                                                    Maggi Evans is a 87 year old female who presents to clinic today for the following health issues:      Rash/Fever      Duration: started this a.m.    Description  Location: on low back  Itching: YES    Intensity:  moderate    Accompanying signs and symptoms: states not feeling good, has low grade fever 99.7 orally    History (similar episodes/previous evaluation): has had rash in past but now worse with Rocephin. Dose held this morning per Gia Khoury APRN CNP     Precipitating or alleviating factors:  New exposures:  None  Recent travel: no      Therapies tried and outcome: none     She was put on Rocephin for possible UTI 3 days ago.  Has had 3 doses total.  Developed a rash on her lower back this morning.  This has happened before when she gets Rocephin per staff members.  They typically use a steroid/antifungal cream on this and it resolves.  She reports not feeling well, but is not able to offer any further information.  Has dementia and is a very poor historian.     Fever once today - 99.7.  No upper respiratory infection symptoms, although staff felt she was \"breathing heavy\" this morning.    No diarrhea/constipation/vomiting/abdominal pain.   No current urinary symptoms.  No ill contacts.   The rash itches.     Problem list and histories reviewed & adjusted, as indicated.  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) "     Dermatophytosis of nail     At risk for falling     Cough     Edema extremities     Skin breakdown     Drug ingestion, accidental     History of recurrent UTIs - last Dec 2014 E coli     Mitral regurgitation     History of skin cancer     Mild major depression (H)     CAD (coronary artery disease)     Health Care Home     UTI (urinary tract infection)     Type 2 diabetes mellitus with diabetic neuropathy     Ovarian cyst     Constipation, unspecified constipation type     Acute posthemorrhagic anemia     Anemia     Schizoaffective disorder, unspecified (H)     Hypothyroidism     Dysuria     Laceration of scalp without foreign body     Chronic hyponatremia     Anemia due to blood loss, acute     External hemorrhoids with complication     Type 2 diabetes mellitus without complication, without long-term current use of insulin (H)     Past Surgical History:   Procedure Laterality Date     ANKLE SURGERY       COLONOSCOPY  05/18/09     HC REMV CATARACT EXTRACAP,INSERT LENS  02/20/2003    left     HC REMV CATARACT EXTRACAP,INSERT LENS  1/16/2003    right eye       Social History   Substance Use Topics     Smoking status: Never Smoker     Smokeless tobacco: Never Used     Alcohol use No     Family History   Problem Relation Age of Onset     Cardiovascular Mother      DIABETES Mother      CEREBROVASCULAR DISEASE Brother      Neurologic Disorder Brother      HEART DISEASE Sister      Breast Cancer Sister          Current Outpatient Prescriptions   Medication Sig Dispense Refill     cephalexin (KEFLEX) 250 MG capsule Take 1 capsule (250 mg) by mouth daily 30 capsule 5     metFORMIN (GLUCOPHAGE-XR) 500 MG 24 hr tablet TAKE 2 TABLETS BY MOUTH ONCE DAILY 56 tablet 3     cyanocobalamin (VITAMIN B12) 1000 MCG/ML injection INJECT 1ML (1000MCG)IM ONCE A MONTH 1 mL 4     clotrimazole (LOTRIMIN) 1 % cream APPLY TO AFFECTED AREA(S) TWICE DAILY AS NEEDED-APPLY WITH BETAMETHASONE 30 g 0     betamethasone dipropionate (DIPROSONE) 0.05  "% cream APPLY TO AFFECTED AREA(S) TWICE DAILY AS NEEDED--APPLY WITH CLOTRIMAZOLE CR 30 g 0     ramipril (ALTACE) 5 MG capsule Take 1 capsule (5 mg) by mouth daily 30 capsule 3     levothyroxine (SYNTHROID/LEVOTHROID) 50 MCG tablet TAKE 1 TABLET BY MOUTH EVER Y DAY 31 tablet 3     Cranberry (CRANBERRY CONCENTRATE) 500 MG CAPS Take 2 capsules (1,000 mg) by mouth 2 times daily 200 capsule 6     SENEXON-S 8.6-50 MG per tablet TAKE 1 TABLET BY MOUTH EVERY 3RD DAY 30 tablet 1     Syringe/Needle, Disp, 23G X 1-1/2\" 3 ML MISC Inject 2 each into the muscle daily For use with rocephin and lidocaine 10 each 0     clotrimazole-betamethasone (LOTRISONE) cream APPLY TO AFFECTED AREA(S) TWICE DAILY AS NEEDED 45 g 0     blood glucose calibration (ONETOUCH ULTRA CONTROL) solution USE TO CALIBRATE BLOOD GLUCOSE MONITOR WHEN STARTING A NEW VIAL OFSTRIPS 1 each 3     UNABLE TO FIND MEDICATION NAME: BUTTE PASTE  Apply as needed. 1 each 11     Cranberry-Vitamin C-Inulin (UTI-STAT) LIQD TAKE 30ML'S BEFORE USING MOUTH 2 TIMES DAILY 887 mL 0     lovastatin (MEVACOR) 10 MG tablet TAKE 1 TABLET BY MOUTH AT BEDTIME 30 tablet 5     Pseudoephedrine HCl (SUDAFED 24 HOUR PO) Take 2 tablets by mouth every 4 hours as needed       blood glucose monitoring (ONE TOUCH DELICA) lancets USE TO TEST BLOOD SUGAR EVERY DAY OR AS DIRECTED 1 Box 1     amLODIPine (NORVASC) 5 MG tablet TAKE 1 TABLET BY MOUTH EVERY DAY *HIGH BLOOD PRESSURE* 31 tablet 3     Alcohol Swabs (B-D SINGLE USE SWABS REGULAR) PADS FOR DIABETIC TESTING AS ORDERED 100 each 3     cholecalciferol (VITAMIN D) 400 UNITS tablet TAKE 2 TABLETS (800IU) BY M OUTH EVERY  tablet 1     blood glucose monitoring (ONE TOUCH ULTRA) test strip by In Vitro route daily 100 each prn     acetaminophen (TYLENOL) 500 MG tablet Take 1,000 mg by mouth every 6 hours as needed for mild pain       guaiFENesin (TUSSIN) 100 MG/5ML LIQD Take 10 mLs by mouth every 4 hours as needed for cough       Skin " Protectants, Misc. (NO STING BARRIER FILM) MISC No Sting Barrier Film Spray Apply as directed 1 each 11     calcium-vitamin D (CALTRATE) 600-400 MG-UNIT per tablet TAKE 1 TABLET BY MOUTH EVERY DAY (CALCIUM DEFICIANCY) 31 tablet 11     Elastic Bandages & Supports (T.E.D. KNEE LENGTH/S-REGULAR) MISC 1 Package daily 1 each 2     polyethylene glycol (MIRALAX) powder Take 17 g (1 capful) by mouth every other day 510 g 1     ORDER FOR DME Equipment being ordered: 2 x 2 allevyn gentle foam dressing  To be used twice daily to open area 4 Package 6     timolol (TIMOPTIC) 0.5 % ophthalmic solution Place 1 drop into both eyes daily        INCONTINENCE BRIEF MEDIUM MISC use at bedtime for incontinence 1 case prn     Allergies   Allergen Reactions     Adhesive Tape Rash     rash--also metal   Pt states bandaids are OK     Penicillins Rash     rash     Liquid Adhesive Rash     BP Readings from Last 3 Encounters:   06/22/17 120/50   06/12/17 120/62   06/06/17 100/56    Wt Readings from Last 3 Encounters:   06/12/17 119 lb (54 kg)   03/10/17 123 lb (55.8 kg)   02/04/17 123 lb (55.8 kg)                    Reviewed and updated as needed this visit by clinical staff  Tobacco  Allergies  Meds  Med Hx  Surg Hx  Fam Hx  Soc Hx      Reviewed and updated as needed this visit by Provider         ROS:  CONSTITUTIONAL:POSITIVE  for fever as above and NEGATIVE  for sweats  INTEGUMENTARY/SKIN: rash as above  E/M: NEGATIVE for ear, mouth and throat problems  R: NEGATIVE for significant cough or SOB  CV: NEGATIVE for chest pain, palpitations or peripheral edema  GI: NEGATIVE for nausea, abdominal pain, heartburn, or change in bowel habits  : NEGATIVE for dysuria, hematuria, flank pain, vaginal symptoms.     OBJECTIVE:                                                    /50  Pulse 102  Temp 98.9  F (37.2  C) (Tympanic)  Resp 20  SpO2 94%  Breastfeeding? No  There is no height or weight on file to calculate BMI.  GENERAL: alert,  no distress and elderly  HENT: ear canals and TM's normal, nose and mouth without ulcers or lesions  NECK: no adenopathy, no asymmetry, masses, or scars and thyroid normal to palpation  RESP: no rales , no rhonchi, no wheezes and decreased breath sounds throughout  CV: regular rate and rhythm, normal S1 S2, no S3 or S4, no murmur, click or rub, no peripheral edema and peripheral pulses strong  ABDOMEN: soft, nontender, no hepatosplenomegaly, no masses and bowel sounds normal  MS: no gross musculoskeletal defects noted, no edema  SKIN: discrete, raised, red lesions on lower back.  It crosses the midline.  No vesicles or drainage, no hives.    Diagnostic Test Results:  Pending      ASSESSMENT/PLAN:                                                        1. Fever, unspecified fever cause  Low grade once.  I am going to get some labs and a chest x-ray, although she does not appear to be in any acute distress.  I had planned to get a WBC count as well, but there was an error and this was not ordered.  She has now left the clinic.  Will contact patient after other labs back and if symptoms not improving, will have her come back in for further labs.     - XR Chest 2 Views; Future  - Comprehensive metabolic panel (BMP + Alb, Alk Phos, ALT, AST, Total. Bili, TP)    2. Rash and nonspecific skin eruption  This does not look like a drug allergy reaction.  As a precaution, will stop the Rocephin as staff say it happens after she gets this.  However, in chart review, she has been prescribed antifungal and steroid medications for rash in the past.  Will have them use the topical steroid for this.  After it resolves, she is set to start daily, suppressive antibiotic treatment for her frequent UTIs.  This is with Cephalexin.  Given I don't think this is an allergy to Rocephin, will start that as planned.  Follow up if symptoms fail to resolve as expected.       See Patient Instructions    JORDY Lawson [facility]  [place]

## 2017-06-22 NOTE — NURSING NOTE
Chief Complaint   Patient presents with     Derm Problem     rash on back side     Fever     99.7 orally at 0800       Initial /50  Pulse 102  Temp 98.9  F (37.2  C) (Tympanic)  Resp 20  SpO2 94%  Breastfeeding? No Estimated body mass index is 23.24 kg/(m^2) as calculated from the following:    Height as of 2/4/17: 5' (1.524 m).    Weight as of 6/12/17: 119 lb (54 kg).  Medication Reconciliation: complete   ................Michael Colby LPN,   June 22, 2017,      11:28 AM,   [facility]

## 2017-06-22 NOTE — TELEPHONE ENCOUNTER
Hold remaninig Rocephin.  Wait to start the Cephalexin until rash is completely resolved and watch for any rash with this.  If rash develops with that, stop it an contact us.     Electronically signed by Gia Khoury CNP.

## 2017-06-22 NOTE — MR AVS SNAPSHOT
After Visit Summary   6/22/2017    Maggi Evans    MRN: [id number]           Patient Information     Date Of Birth          5/31/1930        Visit Information        Provider Department      6/22/2017 11:20 AM Gia Khoury APRN CNP [facility]        Today's Diagnoses     Fever, unspecified fever cause    -  1      Care Instructions    We will call you with the lab and x-ray results.                   Follow-ups after your visit        Future tests that were ordered for you today     Open Future Orders        Priority Expected Expires Ordered    XR Chest 2 Views Routine 6/22/2017 6/22/2018 6/22/2017            Who to contact     If you have questions or need follow up information about today's clinic visit or your schedule please contact [facility] directly at [phone].  Normal or non-critical lab and imaging results will be communicated to you by MyChart, letter or phone within 4 business days after the clinic has received the results. If you do not hear from us within 7 days, please contact the clinic through MyChart or phone. If you have a critical or abnormal lab result, we will notify you by phone as soon as possible.  Submit refill requests through [employer] or call your pharmacy and they will forward the refill request to us. Please allow 3 business days for your refill to be completed.          Additional Information About Your Visit        MyChart Information     [employer] gives you secure access to your electronic health record. If you see a primary care provider, you can also send messages to your care team and make appointments. If you have questions, please call your primary care clinic.  If you do not have a primary care provider, please call [phone] and they will assist you.        Care EveryWhere ID     This is your Care EveryWhere ID. This could be used by other organizations to access your [facility] medical records  [id number]        Your Vitals  Were     Pulse Temperature Respirations Pulse Oximetry Breastfeeding?       102 98.9  F (37.2  C) (Tympanic) 20 94% No        Blood Pressure from Last 3 Encounters:   06/22/17 120/50   06/12/17 120/62   06/06/17 100/56    Weight from Last 3 Encounters:   06/12/17 119 lb (54 kg)   03/10/17 123 lb (55.8 kg)   02/04/17 123 lb (55.8 kg)              We Performed the Following     Comprehensive metabolic panel (BMP + Alb, Alk Phos, ALT, AST, Total. Bili, TP)        Primary Care Provider Office Phone # Fax #    Gia Khoury, APRLOLY [phone]       [facility] [address] [facility] [address]        Equal Access to Services     JOSELINE SIMS : Hadii jessica santamaria hadasho Soomaali, waaxda luqadaha, qaybta kaalmada adeegyada, waxdeirdre orourke haykiera verdugo . So [facility] [phone].    ATENCIÓN: Si habla español, tiene a bray disposición servicios gratuitos de asistencia lingüística. Llame al [phone].    We comply with applicable federal civil rights laws and [place] laws. We do not discriminate on the basis of race, color, national origin, age, disability sex, sexual orientation or gender identity.            Thank you!     Thank you for choosing [facility]  for your care. Our goal is always to provide you with excellent care. Hearing back from our patients is one way we can continue to improve our services. Please take a few minutes to complete the written survey that you may receive in the mail after your visit with us. Thank you!             Your Updated Medication List - Protect others around you: Learn how to safely use, store and throw away your medicines at www.disposemymeds.org.          This list is accurate as of: 6/22/17 11:41 AM.  Always use your most recent med list.                   Brand Name Dispense Instructions for use Diagnosis    acetaminophen 500 MG tablet    TYLENOL     Take 1,000 mg by mouth every 6 hours as needed for mild pain        amLODIPine 5  MG tablet    NORVASC    31 tablet    TAKE 1 TABLET BY MOUTH EVERY DAY *HIGH BLOOD PRESSURE*    Hypertension goal BP (blood pressure) < 140/90       B-D SINGLE USE SWABS REGULAR Pads     100 each    FOR DIABETIC TESTING AS ORDERED    Type 2 diabetes mellitus without complication, without long-term current use of insulin (H), Vitamin B12 deficiency disease       betamethasone dipropionate 0.05 % cream    DIPROSONE    30 g    APPLY TO AFFECTED AREA(S) TWICE DAILY AS NEEDED--APPLY WITH CLOTRIMAZOLE CR    Rash and nonspecific skin eruption       blood glucose calibration solution     1 each    USE TO CALIBRATE BLOOD GLUCOSE MONITOR WHEN STARTING A NEW VIAL OFSTRIPS    Type 2 diabetes mellitus with complication, without long-term current use of insulin (H)       blood glucose monitoring lancets     1 Box    USE TO TEST BLOOD SUGAR EVERY DAY OR AS DIRECTED    Type 2 diabetes, HbA1c goal < 7% (H)       blood glucose monitoring test strip    ONE TOUCH ULTRA    100 each    by In Vitro route daily    Type 2 diabetes mellitus with diabetic neuropathy, without long-term current use of insulin (H)       calcium-vitamin D 600-400 MG-UNIT per tablet    CALTRATE    31 tablet    TAKE 1 TABLET BY MOUTH EVERY DAY (CALCIUM DEFICIANCY)    Post-menopause       cephalexin 250 MG capsule    KEFLEX    30 capsule    Take 1 capsule (250 mg) by mouth daily    Recurrent UTI (urinary tract infection)       cholecalciferol 400 UNITS tablet    Vitamin D    180 tablet    TAKE 2 TABLETS (800IU) BY M OUTH EVERY DAY    Type 2 diabetes mellitus without complication, without long-term current use of insulin (H)       clotrimazole 1 % cream    LOTRIMIN    30 g    APPLY TO AFFECTED AREA(S) TWICE DAILY AS NEEDED-APPLY WITH BETAMETHASONE    Rash and nonspecific skin eruption       clotrimazole-betamethasone cream    LOTRISONE    45 g    APPLY TO AFFECTED AREA(S) TWICE DAILY AS NEEDED    Rash and nonspecific skin eruption       Cranberry 500 MG Caps     "CRANBERRY CONCENTRATE    200 capsule    Take 2 capsules (1,000 mg) by mouth 2 times daily    Urinary frequency       cyanocobalamin 1000 MCG/ML injection    VITAMIN B12    1 mL    INJECT 1ML (1000MCG)IM ONCE A MONTH    Vitamin B 12 deficiency       Incontinence Brief Medium Misc     1 case    use at bedtime for incontinence        levothyroxine 50 MCG tablet    SYNTHROID/LEVOTHROID    31 tablet    TAKE 1 TABLET BY MOUTH EVER Y DAY    Acquired hypothyroidism       lovastatin 10 MG tablet    MEVACOR    30 tablet    TAKE 1 TABLET BY MOUTH AT BEDTIME    Hyperlipidemia LDL goal <160       metFORMIN 500 MG 24 hr tablet    GLUCOPHAGE-XR    56 tablet    TAKE 2 TABLETS BY MOUTH ONCE DAILY    Type 2 diabetes mellitus with diabetic neuropathy, without long-term current use of insulin (H)       NO STING BARRIER FILM Misc     1 each    No Sting Barrier Film Spray Apply as directed    Skin breakdown       order for DME     4 Package    Equipment being ordered: 2 x 2 allevyn gentle foam dressing To be used twice daily to open area    Ulcer of sacral region, unspecified pressure ulcer stage       polyethylene glycol powder    MIRALAX    510 g    Take 17 g (1 capful) by mouth every other day    Constipation, unspecified constipation type       ramipril 5 MG capsule    ALTACE    30 capsule    Take 1 capsule (5 mg) by mouth daily    Hypertension goal BP (blood pressure) < 140/90       SENEXON-S 8.6-50 MG per tablet   Generic drug:  senna-docusate     30 tablet    TAKE 1 TABLET BY MOUTH EVERY 3RD DAY    External hemorrhoids with complication       SUDAFED 24 HOUR PO      Take 2 tablets by mouth every 4 hours as needed        Syringe/Needle (Disp) 23G X 1-1/2\" 3 ML Misc     10 each    Inject 2 each into the muscle daily For use with rocephin and lidocaine    Urinary tract infection without hematuria, site unspecified, Urinary tract infection, site not specified       T.E.D. KNEE LENGTH/S-REGULAR Misc     1 each    1 Package daily    " Edema extremities       timolol 0.5 % ophthalmic solution    TIMOPTIC     Place 1 drop into both eyes daily        TUSSIN 100 MG/5ML Liqd   Generic drug:  guaiFENesin      Take 10 mLs by mouth every 4 hours as needed for cough        UNABLE TO FIND     1 each    MEDICATION NAME: BUTTE PASTE Apply as needed.    Type 2 diabetes mellitus with complication, without long-term current use of insulin (H)       UTI-STAT Liqd     887 mL    TAKE 30ML'S BEFORE USING MOUTH 2 TIMES DAILY    Dysuria

## 2017-06-26 NOTE — PROGRESS NOTES
[facility] now requests orders and shares plan of care/discharge summaries for some patients through [employer].  Please REPLY TO THIS MESSAGE in order to give authorization for orders when needed.  This is considered a verbal order, you will still receive a faxed copy of orders for signature.  Thank you for your assistance in improving collaboration for our patients.    ORDER        SN 1 month 2, 5 prn     SN to perform assessment with focus on medication management and reconciliation, pain management, mental health status and need for referral or change in treatment plan, /GI status, CVP status, skin integrity, falls risk, and to notify physician for the following clinical findings outside standard parameters. Instruct on disease process, signs/symptoms of complications to report to agency/physician/911, emergency/safety and falls prevention plan, medication effects/SE, new/high risk/changed medications, diet, and activity. Implement interventions to monitor and mitigate pain, prevent pressure ulcers and confirm proper foot care.   7 prn visits for medical symptoms that would necessitate an unplanned visit, supervisory visits per agency protocol, recertification assessments.     Cyanocobalamin 1000 mcg/ml IM every 30 days

## 2017-06-27 NOTE — TELEPHONE ENCOUNTER
Routing refill request to provider for review/approval because:  Drug not on the [facility] refill protocol   Tablets were previously sent. Liquid is now being requested.    Lenore Villavicencio RN  [facility]

## 2017-06-27 NOTE — TELEPHONE ENCOUNTER
Cranberry Vit C insulin       Last Written Prescription Date: 4/19/2017  Last Fill Quantity: 887,  # refills: 0   Last Office Visit with [facility], [facility] or [facility] prescribing provider: 6/22/2017

## 2017-07-05 NOTE — PROGRESS NOTES
6-30-17   CC received a list with client name on this because she was receiving products from [facility] and they have closed.  CC reached out to WANDY Lopez  and she stated that they were going to use [facility] pharmacy and see how this went.  CC offered to provider a list of other vendors but she stated she would reach out to CC if needed.   Honey Borrego MA [facility] Coordinator   [phone]

## 2017-07-06 NOTE — PROGRESS NOTES
Please triage this.  She is on a fluid restriction because of chronic hyponatremia.     Electronically signed by Gia Khoury CNP.

## 2017-07-06 NOTE — PROGRESS NOTES
Continue to monitor.  I think some of this is her baseline.  If symptoms worsen or staff that are familiar with her are concerned, she should be seen.     Electronically signed by Gia Khoury CNP.

## 2017-07-06 NOTE — PROGRESS NOTES
Update.   Jacqui, Nurse, Damaris Gonzalez here with patient Maggi Evans for Vit B12 injection.   Her Nurse/PCA report concerns of increased sleepiness and pt not wanting to take a shower.   PCA reports she is on a 55 cc fluid restriction a day/Unknown reason.   She is on prophylactic oral abx for chronic UTI.   Please let me know if further intervention needed.  Vitals stable. No fever.     Thank you  Damaris Montesinos RN   [phone]

## 2017-07-06 NOTE — PROGRESS NOTES
Called and spoke to Damaris Gonzalez. Informed her of the message below. She understands and agrees with the plan of care.     Lenore Villavicencio RN  [facility]

## 2017-07-06 NOTE — PROGRESS NOTES
Called and spoke to nurse Damaris Gonzalez. She said this is the first day she has had with the patient. Patient seems very sleepy and wants to be in bed all the time. She is already wanting to go to bed now when RN called. Damaris Gonzalez said she was going to have staff increase her water but then was told she is on fluid restrictions. Damaris Gonzalez said she also refused to shower today which is very unlike her. She said her vitals are perfect and she seems fine besides seeming more sleepy.     Damaris Gonzalez was more curious why the fluid restrictions and just wanted to update Gia. They will continue to watch her.     Routing to provider.     Lenore Villavicencio RN  [facility]

## 2017-07-06 NOTE — PROGRESS NOTES
Received after visit chart from care coordinator.  Completed following tasks: Mailed copy of care plan to client nephew per CM request  Updated services in access  Chart was returned to CC.     Emily Sarkar  Case Management Specialist   [facility]   [phone]

## 2017-07-12 NOTE — TELEPHONE ENCOUNTER
Calcium-vitamin D    Last Written Prescription Date: 7/20/16  Last Fill Quantity: 31, # refills: 11  Last Office Visit with [facility], [facility] or [facility] prescribing provider: 6/22/17       DEXA Scan:  No order of DX HIP/PELVIS/SPINE is found.     No order of DX HIP/PELVIS/SPINE W LAT FRACTION ANALYSIS is found.       Creatinine   Date Value Ref Range Status   06/22/2017 0.66 0.52 - 1.04 mg/dL Final       Vitamin D    Last Written Prescription Date: 1/23/17  Last Fill Quantity: 180, # refills: 1  Last Office Visit with [facility], [facility] or [facility] prescribing provider: 6/22/17       DEXA Scan:  No order of DX HIP/PELVIS/SPINE is found.     No order of DX HIP/PELVIS/SPINE W LAT FRACTION ANALYSIS is found.       Creatinine   Date Value Ref Range Status   06/22/2017 0.66 0.52 - 1.04 mg/dL Final

## 2017-07-12 NOTE — TELEPHONE ENCOUNTER
amLODIPine (NORVASC) 5 MG tablet      Last Written Prescription Date: 3/22/16  Last Fill Quantity: 31, # refills: 3    Last Office Visit with [facility], [facility] or [facility] prescribing provider:  6/22/17   Future Office Visit:        BP Readings from Last 3 Encounters:   06/22/17 120/50   06/12/17 120/62   06/06/17 100/56

## 2017-07-12 NOTE — TELEPHONE ENCOUNTER
Prescription approved per [facility] Refill Protocol.    Lenore Villavicencio RN  [facility]

## 2017-07-12 NOTE — TELEPHONE ENCOUNTER
Routing refill request to provider for review/approval because:  Drug not on the [facility] refill protocol for associated diagnosis  Labs not current:  DEXA scan    Lenore Villavicencio RN  [facility]

## 2017-07-28 NOTE — PATIENT INSTRUCTIONS
Continue to ice this for 20 minutes at a time - 3-4 times per day.     Give her the Tylenol as needed every 6 hours for pain.     Follow up if symptoms fail to resolve as expected.

## 2017-07-28 NOTE — PROGRESS NOTES
SUBJECTIVE:                                                    Maggi Evans is a 87 year old female who presents to clinic today for the following health issues:      Musculoskeletal problem/pain      Duration: 1 day    Description  Location: left hand/wrist    Intensity:  mild    Accompanying signs and symptoms: none    History  Previous similar problem: no   Previous evaluation:  none    Precipitating or alleviating factors:  Trauma or overuse: no   Aggravating factors include: none    Therapies tried and outcome: ice, tylenol have helped    No injury, but hand has been painful to 1 day.     Problem list and histories reviewed & adjusted, as indicated.  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)     Dermatophytosis of nail     At risk for falling     Cough     Edema extremities     Skin breakdown     Drug ingestion, accidental     History of recurrent UTIs - last Dec 2014 E coli     Mitral regurgitation     History of skin cancer     Mild major depression (H)     CAD (coronary artery disease)     Health Care Home     UTI (urinary tract infection)     Type 2 diabetes mellitus with diabetic neuropathy     Ovarian cyst     Constipation, unspecified constipation type     Acute posthemorrhagic anemia     Anemia     Schizoaffective disorder, unspecified (H)     Hypothyroidism     Dysuria     Laceration of scalp without foreign body     Chronic hyponatremia     Anemia due to blood loss, acute      External hemorrhoids with complication     Type 2 diabetes mellitus without complication, without long-term current use of insulin (H)     Past Surgical History:   Procedure Laterality Date     ANKLE SURGERY       COLONOSCOPY  05/18/09     HC REMV CATARACT EXTRACAP,INSERT LENS  02/20/2003    left     HC REMV CATARACT EXTRACAP,INSERT LENS  1/16/2003    right eye       Social History   Substance Use Topics     Smoking status: Never Smoker     Smokeless tobacco: Never Used     Alcohol use No     Family History   Problem Relation Age of Onset     Cardiovascular Mother      DIABETES Mother      CEREBROVASCULAR DISEASE Brother      Neurologic Disorder Brother      HEART DISEASE Sister      Breast Cancer Sister          Current Outpatient Prescriptions   Medication Sig Dispense Refill     amLODIPine (NORVASC) 5 MG tablet TAKE 1 TABLET BY MOUTH ONCE DAILY 28 tablet 5     calcium-vitamin D (CALTRATE) 600-400 MG-UNIT per tablet TAKE 1 TABLET BY MOUTH ONCE DAILY 28 tablet 0     cholecalciferol (VITAMIN D) 400 UNITS tablet TAKE 2 TABLETS BY MOUTH ONCE DAILY 56 tablet 0     blood glucose monitoring (ONE TOUCH DELICA) lancets Use to test blood sugars as needed for signs of hypoglycemia.  Up to 2 times daily, PRN 1 each 11     Cranberry-Vitamin C-Inulin (UTI-STAT) LIQD TAKE 30ML'S BY MOUTH MOUTH 2 TIMES DAILY 887 mL 0     cephalexin (KEFLEX) 250 MG capsule Take 1 capsule (250 mg) by mouth daily 30 capsule 5     metFORMIN (GLUCOPHAGE-XR) 500 MG 24 hr tablet TAKE 2 TABLETS BY MOUTH ONCE DAILY 56 tablet 3     cyanocobalamin (VITAMIN B12) 1000 MCG/ML injection INJECT 1ML (1000MCG)IM ONCE A MONTH 1 mL 4     clotrimazole (LOTRIMIN) 1 % cream APPLY TO AFFECTED AREA(S) TWICE DAILY AS NEEDED-APPLY WITH BETAMETHASONE 30 g 0     betamethasone dipropionate (DIPROSONE) 0.05 % cream APPLY TO AFFECTED AREA(S) TWICE DAILY AS NEEDED--APPLY WITH CLOTRIMAZOLE CR 30 g 0     ramipril (ALTACE) 5 MG capsule Take 1 capsule (5 mg) by mouth daily 30  "capsule 3     levothyroxine (SYNTHROID/LEVOTHROID) 50 MCG tablet TAKE 1 TABLET BY MOUTH EVER Y DAY 31 tablet 3     Cranberry (CRANBERRY CONCENTRATE) 500 MG CAPS Take 2 capsules (1,000 mg) by mouth 2 times daily 200 capsule 6     SENEXON-S 8.6-50 MG per tablet TAKE 1 TABLET BY MOUTH EVERY 3RD DAY 30 tablet 1     Syringe/Needle, Disp, 23G X 1-1/2\" 3 ML MISC Inject 2 each into the muscle daily For use with rocephin and lidocaine 10 each 0     clotrimazole-betamethasone (LOTRISONE) cream APPLY TO AFFECTED AREA(S) TWICE DAILY AS NEEDED 45 g 0     blood glucose calibration (ONETOUCH ULTRA CONTROL) solution USE TO CALIBRATE BLOOD GLUCOSE MONITOR WHEN STARTING A NEW VIAL OFSTRIPS 1 each 3     UNABLE TO FIND MEDICATION NAME: BUTTE PASTE  Apply as needed. 1 each 11     lovastatin (MEVACOR) 10 MG tablet TAKE 1 TABLET BY MOUTH AT BEDTIME 30 tablet 5     Pseudoephedrine HCl (SUDAFED 24 HOUR PO) Take 2 tablets by mouth every 4 hours as needed       Alcohol Swabs (B-D SINGLE USE SWABS REGULAR) PADS FOR DIABETIC TESTING AS ORDERED 100 each 3     blood glucose monitoring (ONE TOUCH ULTRA) test strip by In Vitro route daily 100 each prn     acetaminophen (TYLENOL) 500 MG tablet Take 1,000 mg by mouth every 6 hours as needed for mild pain       guaiFENesin (TUSSIN) 100 MG/5ML LIQD Take 10 mLs by mouth every 4 hours as needed for cough       Skin Protectants, Misc. (NO STING BARRIER FILM) MISC No Sting Barrier Film Spray Apply as directed 1 each 11     Elastic Bandages & Supports (T.E.D. KNEE LENGTH/S-REGULAR) MISC 1 Package daily 1 each 2     polyethylene glycol (MIRALAX) powder Take 17 g (1 capful) by mouth every other day 510 g 1     ORDER FOR DME Equipment being ordered: 2 x 2 allevyn gentle foam dressing  To be used twice daily to open area 4 Package 6     timolol (TIMOPTIC) 0.5 % ophthalmic solution Place 1 drop into both eyes daily        INCONTINENCE BRIEF MEDIUM MISC use at bedtime for incontinence 1 case prn     Allergies "   Allergen Reactions     Adhesive Tape Rash     rash--also metal   Pt states bandaids are OK     Penicillins Rash     rash     Liquid Adhesive Rash     BP Readings from Last 3 Encounters:   07/28/17 102/50   06/22/17 120/50   06/12/17 120/62    Wt Readings from Last 3 Encounters:   07/28/17 119 lb (54 kg)   06/12/17 119 lb (54 kg)   03/10/17 123 lb (55.8 kg)             Reviewed and updated as needed this visit by clinical staffTobacco  Allergies  Meds  Med Hx  Surg Hx  Fam Hx  Soc Hx      Reviewed and updated as needed this visit by Provider         ROS:  C: NEGATIVE for fever, chills, change in weight  E/M: NEGATIVE for ear, mouth and throat problems  R: NEGATIVE for significant cough or SOB  CV: NEGATIVE for chest pain, palpitations or peripheral edema  MUSCULOSKELETAL: left hand pain as above     OBJECTIVE:     /50  Pulse 90  Temp 96.9  F (36.1  C) (Tympanic)  Resp 20  Wt 119 lb (54 kg)  SpO2 95%  BMI 23.24 kg/m2  Body mass index is 23.24 kg/(m^2).  GENERAL: healthy, alert and no distress  MS: left hand/wrist: no deformity, normal ROM, no tenderness to palpation, no skin changes.  SKIN: no suspicious lesions or rashes    Diagnostic Test Results:  none     ASSESSMENT/PLAN:       1. Sprain of left hand, initial encounter  Advised on ice, Tylenol.  Should resolve with time.  Follow up if symptoms fail to resolve as expected.     2. Seizure (H)  Chronic, controlled.  No change in treatment plan.     3. Coronary artery disease involving native heart without angina pectoris, unspecified vessel or lesion type  Chronic, controlled.  No change in treatment plan.     4. Type 2 diabetes mellitus with diabetic neuropathy, without long-term current use of insulin (H)  Chronic, controlled.  No change in treatment plan.       FUTURE APPOINTMENTS:       - Follow-up for annual visit or as needed  See Patient Instructions    JORDY Lawson [facility]

## 2017-07-28 NOTE — MR AVS SNAPSHOT
After Visit Summary   7/28/2017    Maggi Evans    MRN: [id number]           Patient Information     Date Of Birth          5/31/1930        Visit Information        Provider Department      7/28/2017 9:00 AM Gia Khoury APRN [facility]        Today's Diagnoses     Seizure (H)    -  1    Coronary artery disease involving native heart without angina pectoris, unspecified vessel or lesion type        Type 2 diabetes mellitus with diabetic neuropathy, without long-term current use of insulin (H)          Care Instructions    Continue to ice this for 20 minutes at a time - 3-4 times per day.     Give her the Tylenol as needed every 6 hours for pain.     Follow up if symptoms fail to resolve as expected.               Follow-ups after your visit        Who to contact     If you have questions or need follow up information about today's clinic visit or your schedule please contact [facility] directly at [phone].  Normal or non-critical lab and imaging results will be communicated to you by [employer], letter or phone within 4 business days after the clinic has received the results. If you do not hear from us within 7 days, please contact the clinic through [employer] or phone. If you have a critical or abnormal lab result, we will notify you by phone as soon as possible.  Submit refill requests through [employer] or call your pharmacy and they will forward the refill request to us. Please allow 3 business days for your refill to be completed.          Additional Information About Your Visit        MyChart Information     [employer] gives you secure access to your electronic health record. If you see a primary care provider, you can also send messages to your care team and make appointments. If you have questions, please call your primary care clinic.  If you do not have a primary care provider, please call [phone] and they will assist you.        Care EveryWhere ID      This is your Care EveryWhere ID. This could be used by other organizations to access your [facility] medical records  [id number]        Your Vitals Were     Pulse Temperature Respirations Pulse Oximetry BMI (Body Mass Index)       90 96.9  F (36.1  C) (Tympanic) 20 95% 23.24 kg/m2        Blood Pressure from Last 3 Encounters:   07/28/17 102/50   06/22/17 120/50   06/12/17 120/62    Weight from Last 3 Encounters:   07/28/17 119 lb (54 kg)   06/12/17 119 lb (54 kg)   03/10/17 123 lb (55.8 kg)              Today, you had the following     No orders found for display       Primary Care Provider Office Phone # Fax #    GiaJORDY Solis [phone]       [facility] [address] [facility] [address]        Equal Access to Services     JOSELINE SIMS : Hadii jessica ku hadasho Soomaali, waaxda luqadaha, qaybta kaalmada adeegyada, waxdeirdre orourke haykiera verdugo . So [facility] [phone].    ATENCIÓN: Si habla español, tiene a bray disposición servicios gratuitos de asistencia lingüística. Llame al [phone].    We comply with applicable federal civil rights laws and [place] laws. We do not discriminate on the basis of race, color, national origin, age, disability sex, sexual orientation or gender identity.            Thank you!     Thank you for choosing [facility]  for your care. Our goal is always to provide you with excellent care. Hearing back from our patients is one way we can continue to improve our services. Please take a few minutes to complete the written survey that you may receive in the mail after your visit with us. Thank you!             Your Updated Medication List - Protect others around you: Learn how to safely use, store and throw away your medicines at www.disposemymeds.org.          This list is accurate as of: 7/28/17  9:41 AM.  Always use your most recent med list.                   Brand Name Dispense Instructions for use Diagnosis    acetaminophen  500 MG tablet    TYLENOL     Take 1,000 mg by mouth every 6 hours as needed for mild pain        amLODIPine 5 MG tablet    NORVASC    28 tablet    TAKE 1 TABLET BY MOUTH ONCE DAILY    Hypertension goal BP (blood pressure) < 140/90       B-D SINGLE USE SWABS REGULAR Pads     100 each    FOR DIABETIC TESTING AS ORDERED    Type 2 diabetes mellitus without complication, without long-term current use of insulin (H), Vitamin B12 deficiency disease       betamethasone dipropionate 0.05 % cream    DIPROSONE    30 g    APPLY TO AFFECTED AREA(S) TWICE DAILY AS NEEDED--APPLY WITH CLOTRIMAZOLE CR    Rash and nonspecific skin eruption       blood glucose calibration solution     1 each    USE TO CALIBRATE BLOOD GLUCOSE MONITOR WHEN STARTING A NEW VIAL OFSTRIPS    Type 2 diabetes mellitus with complication, without long-term current use of insulin (H)       blood glucose monitoring lancets     1 each    Use to test blood sugars as needed for signs of hypoglycemia.  Up to 2 times daily, PRN    Type 2 diabetes mellitus without complication, without long-term current use of insulin (H)       blood glucose monitoring test strip    ONE TOUCH ULTRA    100 each    by In Vitro route daily    Type 2 diabetes mellitus with diabetic neuropathy, without long-term current use of insulin (H)       calcium-vitamin D 600-400 MG-UNIT per tablet    CALTRATE    28 tablet    TAKE 1 TABLET BY MOUTH ONCE DAILY    Post-menopause       cephalexin 250 MG capsule    KEFLEX    30 capsule    Take 1 capsule (250 mg) by mouth daily    Recurrent UTI (urinary tract infection)       cholecalciferol 400 UNITS tablet    Vitamin D    56 tablet    TAKE 2 TABLETS BY MOUTH ONCE DAILY    Type 2 diabetes mellitus without complication, without long-term current use of insulin (H)       clotrimazole 1 % cream    LOTRIMIN    30 g    APPLY TO AFFECTED AREA(S) TWICE DAILY AS NEEDED-APPLY WITH BETAMETHASONE    Rash and nonspecific skin eruption        "clotrimazole-betamethasone cream    LOTRISONE    45 g    APPLY TO AFFECTED AREA(S) TWICE DAILY AS NEEDED    Rash and nonspecific skin eruption       Cranberry 500 MG Caps    CRANBERRY CONCENTRATE    200 capsule    Take 2 capsules (1,000 mg) by mouth 2 times daily    Urinary frequency       cyanocobalamin 1000 MCG/ML injection    VITAMIN B12    1 mL    INJECT 1ML (1000MCG)IM ONCE A MONTH    Vitamin B 12 deficiency       Incontinence Brief Medium Misc     1 case    use at bedtime for incontinence        levothyroxine 50 MCG tablet    SYNTHROID/LEVOTHROID    31 tablet    TAKE 1 TABLET BY MOUTH EVER Y DAY    Acquired hypothyroidism       lovastatin 10 MG tablet    MEVACOR    30 tablet    TAKE 1 TABLET BY MOUTH AT BEDTIME    Hyperlipidemia LDL goal <160       metFORMIN 500 MG 24 hr tablet    GLUCOPHAGE-XR    56 tablet    TAKE 2 TABLETS BY MOUTH ONCE DAILY    Type 2 diabetes mellitus with diabetic neuropathy, without long-term current use of insulin (H)       NO STING BARRIER FILM Misc     1 each    No Sting Barrier Film Spray Apply as directed    Skin breakdown       order for [facility]     4 Package    Equipment being ordered: 2 x 2 allevyn gentle foam dressing To be used twice daily to open area    Ulcer of sacral region, unspecified pressure ulcer stage       polyethylene glycol powder    MIRALAX    510 g    Take 17 g (1 capful) by mouth every other day    Constipation, unspecified constipation type       ramipril 5 MG capsule    ALTACE    30 capsule    Take 1 capsule (5 mg) by mouth daily    Hypertension goal BP (blood pressure) < 140/90       SENEXON-S 8.6-50 MG per tablet   Generic drug:  senna-docusate     30 tablet    TAKE 1 TABLET BY MOUTH EVERY 3RD DAY    External hemorrhoids with complication       SUDAFED 24 HOUR PO      Take 2 tablets by mouth every 4 hours as needed        Syringe/Needle (Disp) 23G X 1-1/2\" 3 ML Misc     10 each    Inject 2 each into the muscle daily For use with rocephin and lidocaine    Urinary " tract infection without hematuria, site unspecified, Urinary tract infection, site not specified       T.E.D. KNEE LENGTH/S-REGULAR Misc     1 each    1 Package daily    Edema extremities       timolol 0.5 % ophthalmic solution    TIMOPTIC     Place 1 drop into both eyes daily        TUSSIN 100 MG/5ML Liqd   Generic drug:  guaiFENesin      Take 10 mLs by mouth every 4 hours as needed for cough        UNABLE TO FIND     1 each    MEDICATION NAME: BUTTE PASTE Apply as needed.    Type 2 diabetes mellitus with complication, without long-term current use of insulin (H)       UTI-STAT Liqd     887 mL    TAKE 30ML'S BY MOUTH MOUTH 2 TIMES DAILY    Dysuria

## 2017-07-28 NOTE — NURSING NOTE
Chief Complaint   Patient presents with     Musculoskeletal Problem     left wrist pain x1 day-no injury       Initial /50  Pulse 90  Temp 96.9  F (36.1  C) (Tympanic)  Resp 20  Wt 119 lb (54 kg)  SpO2 95%  BMI 23.24 kg/m2 Estimated body mass index is 23.24 kg/(m^2) as calculated from the following:    Height as of 2/4/17: 5' (1.524 m).    Weight as of this encounter: 119 lb (54 kg).  Medication Reconciliation: complete   ................Michael Colby LPN,   July 28, 2017,      9:28 AM,   [facility]

## 2017-07-31 NOTE — TELEPHONE ENCOUNTER
We need to have her do some stool samples to look for other reasons for this first.  Orders placed, please have them bring these back.     Electronically signed by Gia Khoury CNP.

## 2017-07-31 NOTE — TELEPHONE ENCOUNTER
Called and spoke to Jessica. She said the patient has had issues with loose stools since she has been there (at least since [place]). Jessica said patient was taken off her Miralax and only has her Senna as a PRN. She said she cant recall the last time she needed Senna. Jessica said the patient was switched the XR Metformin in hopes it would help some but Jessica said it has not helped. She said she believes these loose stools are causing some of her UTI issues with the ecoli. She said the patient is in a pull up all the time.     Jessica said the patient does not have loose stools everyday. She said they are loose whenever she has a BM. She said they are either loose or straight water. They have no consistency to them.     Jessica said they were hoping to get something started that will promote thicker stools. She said she wants to help improve the patient's quality of life and help her out with getting her stools thicker.    Will route to Gia for further advise.     Lenore Villavicencio RN  [facility]

## 2017-08-01 NOTE — TELEPHONE ENCOUNTER
Left msg with Jessica's voicemail to come get vials for stool samples at the clinic, call with any questions.  ................Michael Colby LPN,   August 1, 2017,      7:45 AM,   [facility]

## 2017-08-02 NOTE — TELEPHONE ENCOUNTER
Calcium +D    Last Written Prescription Date: 07/12/2017  Last Fill Quantity: 28, # refills: 0  Last Office Visit with [facility], [facility] or [facility] prescribing provider: 07/28/2017       DEXA Scan:  No order of DX HIP/PELVIS/SPINE is found.     No order of DX HIP/PELVIS/SPINE W LAT FRACTION ANALYSIS is found.       Creatinine   Date Value Ref Range Status   06/22/2017 0.66 0.52 - 1.04 mg/dL Final     Vitamin D 400IU      Last Written Prescription Date: 07/12/2017  Last Fill Quantity: 56,  # refills: 0   Last Office Visit with [facility], [facility] or [facility] prescribing provider: 07/28/2017

## 2017-08-02 NOTE — TELEPHONE ENCOUNTER
Routing refill request to provider for review/approval because:  No DEXA completed    Lenore Villavicencio RN  [facility]

## 2017-08-18 NOTE — TELEPHONE ENCOUNTER
Incontinence Supply Disposable      Last Written Prescription Date:  6/21/2004  Last Fill Quantity: 1 box,   # refills: PRN  Last Office Visit with [facility] or  Health prescribing provider: 7/28/17  Future Office visit:       Routing refill request to provider for review/approval because:  Drug not on the [facility] or  Health refill protocol or controlled substance

## 2017-08-27 NOTE — PROGRESS NOTES
[facility] now requests orders and shares plan of care/discharge summaries for some patients through [employer].  Please REPLY TO THIS MESSAGE in order to give authorization for orders when needed.  This is considered a verbal order, you will still receive a faxed copy of orders for signature.  Thank you for your assistance in improving collaboration for our patients.    ORDER   SN 1 month 2, 7 prn   SN to perform assessment with focus on medication management and reconciliation, pain management, mental health status and need for referral or change in treatment plan, /GI status, CVP status, skin integrity, falls risk, and to notify physician for the following clinical findings outside standard parameters. Instruct on disease process, signs/symptoms of complications to report to agency/physician/911, emergency/safety and falls prevention plan, medication effects/SE, new/high risk/changed medications, diet, and activity. Implement interventions to monitor and mitigate pain, prevent pressure ulcers and confirm proper foot care.   7 prn visits for medical symptoms that would necessitate an unplanned visit, supervisory visits per agency protocol, recertification assessments.    Cyanocobalamin 1000 mcg/ml IM every 30 day

## 2017-09-06 NOTE — TELEPHONE ENCOUNTER
Levothyroxine,   Last Written Prescription Date: 5/24/17  Last Quantity: 31, # refills: 3  Last Office Visit with [facility], [facility] or [facility] prescribing provider: 7/28/17        TSH   Date Value Ref Range Status   11/05/2015 1.31 mcU/mL Final         Ramipril         Last Written Prescription Date: 5/24/17  Last Fill Quantity: 30, # refills: 3  Last Office Visit with [facility], [facility] or [facility] prescribing provider: 7/28/17       Potassium   Date Value Ref Range Status   06/22/2017 4.3 3.4 - 5.3 mmol/L Final     Creatinine   Date Value Ref Range Status   06/22/2017 0.66 0.52 - 1.04 mg/dL Final     BP Readings from Last 3 Encounters:   07/28/17 102/50   06/22/17 120/50   06/12/17 120/62

## 2017-09-06 NOTE — TELEPHONE ENCOUNTER
Synthroid  Routing refill request to provider for review/approval because:  Labs not current:  TSH    Altace  Prescription approved per [facility] Refill Protocol.    Lenore Villavicencio RN  [facility]

## 2017-09-13 NOTE — TELEPHONE ENCOUNTER
I printed off a prescription for this.  Please fax for patient.     Electronically signed by Gia Khoury CNP.

## 2017-09-13 NOTE — TELEPHONE ENCOUNTER
Hard copy Rx faxed to [facility] pharmacy.  ................Michael Colby LPN,   September 13, 2017,      4:41 PM,   [facility]

## 2017-09-13 NOTE — TELEPHONE ENCOUNTER
Reason for Call:  Medication or medication refill:    Do you use a [facility]?  Name of the pharmacy and phone number for the current request:  [facility] in [facility] [place]    Name of the medication requested: plastic gloves    Other request: Honey from pharmacy calling would like to get an RX for these, states that she has been faxing over Rx's but they come back denied as the patient has not gotten these before but the family needs them to apply meds to patient, please call and advise     Can we leave a detailed message on this number? YES    Phone number patient can be reached at: Other phone number:  [phone]    Best Time: any     Call taken on 9/13/2017 at 12:07 PM by Corina Hubbard

## 2017-09-26 NOTE — TELEPHONE ENCOUNTER
Called to follow-up with caregiver regarding MTM; last visit was 4/2017.  No answer.  Left message to call back at a time convenient for her.    Shannan Shannon, Pharm.D.,[facility]  Board Certified Geriatric Pharmacist  Medication Therapy Management Pharmacist  [phone]

## 2017-10-26 NOTE — PROGRESS NOTES
[facility] now requests orders and shares plan of care/discharge summaries for some patients through [employer].  Please REPLY TO THIS MESSAGE in order to give authorization for orders when needed.  This is considered a verbal order, you will still receive a faxed copy of orders for signature.  Thank you for your assistance in improving collaboration for our patients.    ORDER       SN 1month 2, 2 prn    SN to perform assessment with focus on medication management and reconciliation, pain management, mental health status and need for referral or change in treatment plan, /GI status, CVP status, skin integrity, falls risk, and to notify physician for the following clinical findings outside standard parameters. Instruct on disease process, signs/symptoms of complications to report to agency/physician/911, emergency/safety and falls prevention plan, medication effects/SE, new/high risk/changed medications, diet, and activity. Implement interventions to monitor and mitigate pain, prevent pressure ulcers and confirm proper foot care.   2 prn visits for medical symptoms that would necessitate an unplanned visit, supervisory visits per agency protocol, recertification assessments.      Vitamin B12,IM  monthly

## 2017-10-31 NOTE — PROGRESS NOTES
SUBJECTIVE:   Maggi Evans is a 87 year old female who presents to clinic today for the following health issues:        Concern - Tirdness  Onset: couple weeks    Description:   tiredness and not her self.     Intensity: mild, moderate    Progression of Symptoms:  same    Accompanying Signs & Symptoms:  Not wanting to get up in the morning and falling asleep in chair. Wanting to go to bed around 5:30pm    Previous history of similar problem:   Not known    Precipitating factors:   Worsened by: nothing    Alleviating factors:  Improved by: nothing    Therapies Tried and outcome: just trying to keep awake.       Problem list and histories reviewed & adjusted, as indicated.  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)     Dermatophytosis of nail     At risk for falling     Cough     Edema extremities     Skin breakdown     Drug ingestion, accidental     History of recurrent UTIs - last Dec 2014 E coli     Mitral regurgitation     History of skin cancer     Mild major depression (H)     CAD (coronary artery disease)     Health Care Home     UTI (urinary tract infection)     Type 2 diabetes mellitus with diabetic neuropathy     Ovarian cyst     Constipation, unspecified constipation type     Acute posthemorrhagic anemia     Anemia     Schizoaffective disorder, unspecified     Hypothyroidism     Dysuria     Laceration of scalp without foreign body     Chronic hyponatremia     Anemia due to blood  loss, acute     External hemorrhoids with complication     Type 2 diabetes mellitus without complication, without long-term current use of insulin (H)     Past Surgical History:   Procedure Laterality Date     ANKLE SURGERY       COLONOSCOPY  05/18/09     HC REMV CATARACT EXTRACAP,INSERT LENS  02/20/2003    left     HC REMV CATARACT EXTRACAP,INSERT LENS  1/16/2003    right eye       Social History   Substance Use Topics     Smoking status: Never Smoker     Smokeless tobacco: Never Used     Alcohol use No     Family History   Problem Relation Age of Onset     Cardiovascular Mother      DIABETES Mother      CEREBROVASCULAR DISEASE Brother      Neurologic Disorder Brother      HEART DISEASE Sister      Breast Cancer Sister          Current Outpatient Prescriptions   Medication Sig Dispense Refill     lovastatin (MEVACOR) 10 MG tablet TAKE 1 TABLET BY MOUTH AT BEDTIME 28 tablet 0     metFORMIN (GLUCOPHAGE-XR) 500 MG 24 hr tablet TAKE 2 TABLETS BY MOUTH ONCE DAILY 56 tablet 0     Trolamine Salicylate (ASPERCREME) 10 % LOTN Use  Q2 hours PRN - pain.  Up to 4 times daily 141 g 1     order for DME Latex gloves - use as needed. 1 Box 11     ramipril (ALTACE) 5 MG capsule TAKE 1 CAPSULE BY MOUTH ONCE DAILY 28 capsule 5     levothyroxine (SYNTHROID/LEVOTHROID) 50 MCG tablet TAKE 1 TABLET BY MOUTH ONCE DAILY 28 tablet 5     Incontinence Supply Disposable (INCONTINENCE BRIEF MEDIUM) MISC Use as needed 36 each 11     calcium-vitamin D (CALTRATE) 600-400 MG-UNIT per tablet TAKE 1 TABLET BY MOUTH ONCE DAILY 28 tablet 5     cholecalciferol (VITAMIN D) 400 UNITS tablet TAKE 2 TABLETS BY MOUTH ONCE DAILY 56 tablet 5     amLODIPine (NORVASC) 5 MG tablet TAKE 1 TABLET BY MOUTH ONCE DAILY 28 tablet 5     blood glucose monitoring (ONE TOUCH DELICA) lancets Use to test blood sugars as needed for signs of hypoglycemia.  Up to 2 times daily, PRN 1 each 11     Cranberry-Vitamin C-Inulin (UTI-STAT) LIQD TAKE 30ML'S BY MOUTH MOUTH 2 TIMES DAILY  "887 mL 0     cephalexin (KEFLEX) 250 MG capsule Take 1 capsule (250 mg) by mouth daily 30 capsule 5     cyanocobalamin (VITAMIN B12) 1000 MCG/ML injection INJECT 1ML (1000MCG)IM ONCE A MONTH 1 mL 4     clotrimazole (LOTRIMIN) 1 % cream APPLY TO AFFECTED AREA(S) TWICE DAILY AS NEEDED-APPLY WITH BETAMETHASONE 30 g 0     betamethasone dipropionate (DIPROSONE) 0.05 % cream APPLY TO AFFECTED AREA(S) TWICE DAILY AS NEEDED--APPLY WITH CLOTRIMAZOLE CR 30 g 0     Cranberry (CRANBERRY CONCENTRATE) 500 MG CAPS Take 2 capsules (1,000 mg) by mouth 2 times daily 200 capsule 6     SENEXON-S 8.6-50 MG per tablet TAKE 1 TABLET BY MOUTH EVERY 3RD DAY 30 tablet 1     Syringe/Needle, Disp, 23G X 1-1/2\" 3 ML MISC Inject 2 each into the muscle daily For use with rocephin and lidocaine 10 each 0     clotrimazole-betamethasone (LOTRISONE) cream APPLY TO AFFECTED AREA(S) TWICE DAILY AS NEEDED 45 g 0     blood glucose calibration (ONETOUCH ULTRA CONTROL) solution USE TO CALIBRATE BLOOD GLUCOSE MONITOR WHEN STARTING A NEW VIAL OFSTRIPS 1 each 3     UNABLE TO FIND MEDICATION NAME: BUTTE PASTE  Apply as needed. 1 each 11     Alcohol Swabs (B-D SINGLE USE SWABS REGULAR) PADS FOR DIABETIC TESTING AS ORDERED 100 each 3     blood glucose monitoring (ONE TOUCH ULTRA) test strip by In Vitro route daily 100 each prn     acetaminophen (TYLENOL) 500 MG tablet Take 1,000 mg by mouth every 6 hours as needed for mild pain       Skin Protectants, Misc. (NO STING BARRIER FILM) MISC No Sting Barrier Film Spray Apply as directed 1 each 11     Elastic Bandages & Supports (T.E.D. KNEE LENGTH/S-REGULAR) MISC 1 Package daily 1 each 2     ORDER FOR DME Equipment being ordered: 2 x 2 allevyn gentle foam dressing  To be used twice daily to open area 4 Package 6     timolol (TIMOPTIC) 0.5 % ophthalmic solution Place 1 drop into both eyes daily        Pseudoephedrine HCl (SUDAFED 24 HOUR PO) Take 2 tablets by mouth every 4 hours as needed       guaiFENesin (TUSSIN) 100 " MG/5ML LIQD Take 10 mLs by mouth every 4 hours as needed for cough       polyethylene glycol (MIRALAX) powder Take 17 g (1 capful) by mouth every other day (Patient not taking: Reported on 10/31/2017) 510 g 1     Allergies   Allergen Reactions     Adhesive Tape Rash     rash--also metal   Pt states bandaids are OK     Penicillins Rash     rash     Liquid Adhesive Rash     BP Readings from Last 3 Encounters:   10/31/17 116/62   07/28/17 102/50   06/22/17 120/50    Wt Readings from Last 3 Encounters:   10/31/17 119 lb (54 kg)   07/28/17 119 lb (54 kg)   06/12/17 119 lb (54 kg)                        Reviewed and updated as needed this visit by clinical staffTobacco  Allergies  Meds  Med Hx  Surg Hx  Fam Hx  Soc Hx      Reviewed and updated as needed this visit by Provider         ROS:  CONSTITUTIONAL:POSITIVE  for fatigue and NEGATIVE  for fever  and weight loss  E/M: NEGATIVE for ear, mouth and throat problems  R: NEGATIVE for significant cough or SOB  CV: NEGATIVE for chest pain, palpitations or peripheral edema  GI: NEGATIVE for nausea, abdominal pain, heartburn, or change in bowel habits  : NEGATIVE for dysuria, hematuria, flank pain    OBJECTIVE:     /62 (BP Location: Right arm, Patient Position: Chair, Cuff Size: Adult Large)  Pulse 80  Temp 97.2  F (36.2  C) (Temporal)  Resp 16  Wt 119 lb (54 kg)  SpO2 97%  BMI 23.24 kg/m2  Body mass index is 23.24 kg/(m^2).  GENERAL: alert, no distress, frail and elderly  NECK: no adenopathy, no asymmetry, masses, or scars and thyroid normal to palpation  RESP: lungs clear to auscultation - no rales, rhonchi or wheezes  CV: regular rate and rhythm, normal S1 S2, no S3 or S4, no murmur, click or rub, no peripheral edema and peripheral pulses strong  ABDOMEN: soft, nontender, no hepatosplenomegaly, no masses and bowel sounds normal  MS: no gross musculoskeletal defects noted, no edema    Diagnostic Test Results:  Pending     ASSESSMENT/PLAN:         1.  Fatigue, unspecified type  Will check labs and UA.  She has a history of frequent UTIs and hyponatremia.   - *UA reflex to Microscopic and Culture ([place] [facility] (except [place] and [place]); Future    2. Hypertension goal BP (blood pressure) < 140/90  Chronic, controlled.  No change in treatment plan.   - Basic metabolic panel  (Ca, Cl, CO2, Creat, Gluc, K, Na, BUN)  - CBC with platelets    3. SIADH (syndrome of inappropriate ADH production) (H)  Chronic, controlled.  No change in treatment plan.   - Albumin Random Urine Quantitative with Creat Ratio; Future    4. Type 2 diabetes mellitus with diabetic neuropathy, without long-term current use of insulin (H)  Chronic, controlled.  No change in treatment plan.   - Hemoglobin A1c  - TSH with free T4 reflex    5. Hyperlipidemia LDL goal <100  Chronic, controlled.  No change in treatment plan.   - LDL cholesterol direct    6. Need for prophylactic vaccination and inoculation against influenza  - FLU VACCINE, INCREASED ANTIGEN, PRESV FREE, AGE 65+ [54880]  - ADMIN INFLUENZA (For MEDICARE Patients ONLY) []    See Patient Instructions    JORDY Lawson [facility]

## 2017-10-31 NOTE — NURSING NOTE
Chief Complaint   Patient presents with     Fatigue       Initial /62 (BP Location: Right arm, Patient Position: Chair, Cuff Size: Adult Large)  Pulse 80  Temp 97.2  F (36.2  C) (Temporal)  Resp 16  Wt 119 lb (54 kg)  SpO2 97%  BMI 23.24 kg/m2 Estimated body mass index is 23.24 kg/(m^2) as calculated from the following:    Height as of 2/4/17: 5' (1.524 m).    Weight as of this encounter: 119 lb (54 kg).  Medication Reconciliation: complete     Rachel Arevalo MA 10/31/2017  2:51 PM

## 2017-10-31 NOTE — PROGRESS NOTES

## 2017-11-02 NOTE — PROGRESS NOTES
Pt notified via [employer]dala msg.  ................Michael Colby LPN,   November 2, 2017,      7:49 AM,   [facility]

## 2017-11-02 NOTE — TELEPHONE ENCOUNTER
Spoke with Jessica, she saw the message on [employer]. She states she sent a message back, wants to make sure Gia knows the last time patient had rocephin, she broke out in hives, and was red. She said patient is on a prophylactic dose of cephalexin every day, and is wondering if maybe it would work to increase that for a few days. Please advise once culture comes back.   Patient uses [facility] in [place]

## 2017-11-09 NOTE — TELEPHONE ENCOUNTER
Clinic Action Needed:No  Reason for Call: Jessica Moe,  for [facility] reports that Maggi was dx with a UTI and she is on Cipro.  She has had 9 doses and today has developed a rash.  Jessica is asking for a discontinue and or hold order of Abx until she can speak to PCP.  Paged on call provider for [facility] to speak to caller at [phone].  Dr. Sravan Melenedz is on call, page sent at 6:30 pm via [employer] .  Routed to: Not routed.    Cheri Chino, RN  [facility]

## 2017-11-09 NOTE — TELEPHONE ENCOUNTER
Jessica is calling back on this, wanting a call back as soon as you can to discuss. Call her back at [phone]. You can leave a message if no answer.

## 2017-11-17 NOTE — TELEPHONE ENCOUNTER
Lab orders have been placed. The patient may stop by the time to check the urine and blood sugar.    Lou

## 2017-11-17 NOTE — TELEPHONE ENCOUNTER
[facility]- Please come out to patient and draw basic panel, and collect a urine. Per Dr. Blake. Monisha VENTURAA

## 2017-11-19 NOTE — PROGRESS NOTES
Dear Maggi, your recent test results are attached.  The microalbumin is normal.  The chemistry panel shows the sodium to be slightly decreased at 127.  The blood sugar is elevated at 241.  The urine sample is essentially normal.  I would recommend following up with her primary care provider in the next week or so to discuss the low-sodium.    Feel free to contact me via the office or My Chart if you have any questions regarding the above.  Sincerely,  Kyle Blake, DO FACOI

## 2017-11-24 NOTE — TELEPHONE ENCOUNTER
Requested Prescriptions   Pending Prescriptions Disp Refills     Incontinence Supply Disposable (FQ PROTECTIVE UNDERWEAR) MISC [Pharmacy Med Name: U-WEAR PREVAIL MED PV-512] 140 each 0     Sig: USE AS DIRECTED    There is no refill protocol information for this order

## 2017-11-25 NOTE — PROGRESS NOTES
Dear Maggi, your recent test results are attached.  The Urine culture has returned demonstrating an infection with enterococcus.  I have called an antibiotic into the [facility] pharmacy in [place].  You should follow-up with your primary care provider next week for follow-up of your sodium as well as the infection.  Feel free to contact me via the office or My Chart if you have any questions regarding the above.  Sincerely,  DO ALISON AliciaOI

## 2017-11-25 NOTE — TELEPHONE ENCOUNTER
"Please let the patient know that she does have a urinary tract infection and that an antibiotic has been called over to the pharmacy in [place].  I sent her a \"my chart\" message but I do not know if she will get this.  Additionally, she should follow-up with Gia on her low sodium.    Lou  "

## 2017-11-27 NOTE — TELEPHONE ENCOUNTER
Routing refill request to provider for review/approval because:  Drug not on the [facility] refill protocol     Lenore Villavicencio RN  [facility]

## 2017-11-28 NOTE — TELEPHONE ENCOUNTER
cephalexin (KEFLEX) 250 MG capsule  Last Written Prescription Date:  6-21-17  Last Fill Quantity: 30,   # refills: 5  Last Office Visit: 10-31-17  Future Office visit:       Routing refill request to provider for review/approval because:  Drug not on the [facility] or [facility] refill protocol or controlled substance           lovastatin (MEVACOR) 10 MG tablet    Last Written Prescription Date: 10-26-17  Last Fill Quantity: 28, # refills: 0  Last Office Visit with [facility] or [facility] prescribing provider: 10-31-17       Lab Results   Component Value Date    CHOL 123 08/25/2016     Lab Results   Component Value Date    HDL 52 08/25/2016     Lab Results   Component Value Date    LDL 62 10/31/2017    LDL 54 08/25/2016     Lab Results   Component Value Date    TRIG 83 08/25/2016     Lab Results   Component Value Date    CHOLHDLRATIO 1.4 12/30/2014

## 2017-11-30 NOTE — TELEPHONE ENCOUNTER
Hold further doses of Macrobid for possible allergy.  How many doses has she had of the 7 day course?    Electronically signed by Gia Khoury CNP.

## 2017-11-30 NOTE — TELEPHONE ENCOUNTER
Called and spoke to Jessica. Informed her of the message below. She said patient has had 5 full days of Macrobid (10 doses) and 1 dose this morning. 11 doses of the 14 prescribed.     Will route to provider.     Lenore Villavicencio RN  [facility]

## 2017-11-30 NOTE — TELEPHONE ENCOUNTER
Called and spoke to Jessica at the group home. She said patient woke up this morning with a rash on her stomach. Jessica is wondering if it could be the Macrobid since patient seems to be allergic to a lot of medications. Jessica said the rash does not seem to be spreading yet, not raised, and not itchy. She said this rash is presenting like all her other allergic reactions do. Patient took her Macrobid again this morning. Patient is also on Keflex daily but Jessica said she has had that before and does well on that. Jessica said patient does not have a fever, trouble breathing, or swelling in the mouth/tongue/throat. She said patient does seem very tired and just is not acting herself. She said she is still pretty confused. She said her skin color is also not her normal color. She said she almost has a grey richard to the skin. Vitals all look good.     Jessica is wondering what Gia thinks they should do. Her next dose of Macrobid is due at 8 PM.     Will route to provider to advise.     Lenoer Villavicencio RN  [facility]

## 2017-11-30 NOTE — TELEPHONE ENCOUNTER
Called and informed Jessica of the message below. She understands and agrees with the plan of care.     Jessica also wanted to let Gia know, they will be having a team meeting for the patient on 12/5. She said they will be discussing options with hospice. She said the patient's POLST might be changing. She said they will keep Gia up to date.     Will route to provider as MARISOL Villavicencio RN  [facility]

## 2017-11-30 NOTE — TELEPHONE ENCOUNTER
Routing refill request to provider for review/approval because:  Labs out of range:  Microalbumin    Lenore Villavicencio, RN  [facility]

## 2017-11-30 NOTE — TELEPHONE ENCOUNTER
Reason for call:  Patient reporting a symptom    Symptom or request: patient presented with a rash on her stomach this morning, Jessica from the group home believes its from the Macrobid because she is allergic to a lot of medications. Would like a call back to see what should be done, states that she plans on holding the 8 pm dose.     Duration (how long have symptoms been present): <24 hours    Have you been treated for this before? No    Additional comments:     Phone Number patient can be reached at:  [phone]    Best Time:  any    Can we leave a detailed message on this number:  YES    Call taken on 11/30/2017 at 8:25 AM by Lakeisha Crandall

## 2017-12-02 NOTE — TELEPHONE ENCOUNTER
Saw patient today for PRN visit request of group home staff, patient refusing food, fluids, meds, laying in bed, she is alert but will not respond to staff or this nurse, she looks away will not nod her head, she allowed Nurse to take vitals, moved her extremities,  VSS.  Stopped macrobid abx 11/3/17 due to rash      hospice meeting on 12/5/17    Unique Escudero RN  [facility]   [phone]

## 2017-12-04 NOTE — TELEPHONE ENCOUNTER
Reason for Call: Request for an order or referral:    Order or referral being requested: Hospice Homecare    Date needed: as soon as possible    Has the patient been seen by the PCP for this problem? YES    Additional comments: Gregoria is going to the home today at 1 pm, asking for Hospice Evaluation and Treat order    Phone number Patient can be reached at:  Other phone number:  [phone]*    Best Time:  asap - needs by 1 pm    Can we leave a detailed message on this number?  YES    Call taken on 12/4/2017 at 11:04 AM by Kacie Winkler

## 2017-12-05 NOTE — PROGRESS NOTES
12-5-17   CC was asked to be present to members semi annual visit scheduled and to review  HO consult was done yesterday with [facility].  Open was not able to be done because Guardian was not present to sign paperwork.  Open set for 12-6-17 with Guardian and staff at the group home.      Present was Renny - member; Danish - Guardian; Sabrina Wang- [facility]; Jessica Rosa - Group home director;   of [facility] was present as well; [place] - [facility] director; Renny sister was also present      Discussion was had about goals for members care at this time and all present agreed to the HO open if she meets criteria with HO open tomorrow.  Discussion was also had about member being kept comfortable and to not be aggressive with her treatments. Member has hx of UTI that has been more difficult to treat and she refused her medication two days last month.  This then prompted a HC referral which lead to the HO consult.      CC did review if member has products that the group home is getting from [facility] pharmacy and they plan to continue to do this.  Member did ask Jessica [facility] director to reach out to CC if there is any issues or concerns with this.  Review of DME was done as well and member owns her hospital bed and also has her wheelchair as well.  Member is not on Thick it per staff as well.    CC encouraged Jessica to review what products  could provide as well.     Annual visit was scheduled for June 11th and [place] assessment will be done in May.  CC will decide at this time which to attend.  CC will then f/u with HO to ensure they have members Guardianship paperwork per Guardians request and CC will f/u as needed.   Honey Borrego MA [facility] Care Coordinator   [phone]

## 2017-12-06 NOTE — PROGRESS NOTES
[facility] now requests orders and shares plan of care/discharge summaries for some patients through [employer].  Please REPLY TO THIS MESSAGE in order to give authorization for orders when needed.  This is considered a verbal order, you will still receive a faxed copy of orders for signature.  Thank you for your assistance in improving collaboration for our patients.    ORDER    Patient admitted to [facility] effective 12/6/17 for End stage dementia.  If questions or concerns please contact admission clinician listed below.  Pt will be DCd from current homecare services.  thanks

## 2017-12-07 NOTE — PROGRESS NOTES
[facility] now requests orders and shares plan of care/discharge summaries for some patients through [employer].  Please REPLY TO THIS MESSAGE in order to give authorization for orders when needed.  This is considered a verbal order, you will still receive a faxed copy of orders for signature.  Thank you for your assistance in improving collaboration for our patients.     DISCHARGE SUMMARY         [facility] response based off last SN visit on   12/4/17   Pt/ group home staff were educated and verbalized s/s to report to Home Care clinician or physician     Pt/group home staff has been educated on home safety and has demonstrated ability to maintain safety in home environment without injury/falls     Pt/group home staff  has demonstrated ability to maintain condition in the  home  w/o hospitalization, ER visit, or unplanned physicians visit     Pt/group home staff  has been educated and verbalizes knowledge of her disease process, treatment goals and self-care management     Pt/group home staff has demonstrated effective disease management practices     Pt/group home staff  has demonstrated compliance with treatment plan,  meds        DISCHARGE SUMMARY   This 86 year old patient admitted to [facility] on 9/10/2015.Pt has recurrent UTIs with difficulty clearing UTI since pt has developed allergy to many of the top ABX used to treat. Pt has developed rashes from Rocephin, Cipro and Macrobid.    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   Pt has had a general decline in her physical condition in the last 3-6 months. Pts ambulations is slower, pt feels weaker, wants to stay in bed more then usual, has had a appetite  decline. Wt had been stable around 1119lb. currently 106.8 lbs. Is on a 55cc fluid restriction for chronic hyponatremia.   Pt resides in a [facility] have worked at educating the staff about fall risk, pts diagnosis, medicaitons and s/s of infection and when to alert MD, Home care. The last few months Group home staff and pts nephew have been having conversations about hospice. A hospice consult was done on 11/13/17 and again on 12/4/17. Pt and  her nephew Danish would like to begin hospice services.   Temp 97.2, pulse 95, resp 18, /47, SPO2 87 percent on RA. No LE edema. Wt 106.8lbs LUng sounds clear.   Pt is discontinuing home care and has joined hospice on 12.6.17.

## 2017-12-11 NOTE — TELEPHONE ENCOUNTER
Requested Prescriptions   Pending Prescriptions Disp Refills     cyanocobalamin (VITAMIN B12) 1000 MCG/ML injection [Pharmacy Med Name: CYANOCOBALAMIN 1,000 MCG/ML] 1 mL 0     Sig: INJECT 1ML (1000MCG)IM ONCE A MONTH    Vitamin Supplements (Adult) Protocol Passed    12/11/2017 10:57 AM       Passed - High dose Vitamin D not ordered       Passed - Recent or future visit with authorizing provider's specialty    Patient had office visit in the last year or has a visit in the next 30 days with authorizing provider.  See chart review.              Passed - Patient is age 18 or older

## 2017-12-12 NOTE — PROGRESS NOTES
12-6-17   CC was sent notification via email that member was enrolled in [facility] as of 12-6-17 with admitted Dx of Alzheimer/ dementia.    CC will update CPS to reflect this and will f/u as needed.   Honey Borrego MA [facility] Care Coordinator   [phone]

## 2017-12-13 NOTE — TELEPHONE ENCOUNTER
Prescription approved per [facility] Refill Protocol.    Lenore Villavicencio RN  [facility]

## 2018-01-01 ENCOUNTER — MEDICAL CORRESPONDENCE (OUTPATIENT)
Dept: HEALTH INFORMATION MANAGEMENT | Facility: CLINIC | Age: 83
End: 2018-01-01

## 2018-01-16 ENCOUNTER — CARE COORDINATION (OUTPATIENT)
Dept: GERIATRIC MEDICINE | Facility: CLINIC | Age: 83
End: 2018-01-16

## 2018-01-16 DIAGNOSIS — E11.40 TYPE 2 DIABETES MELLITUS WITH DIABETIC NEUROPATHY, WITHOUT LONG-TERM CURRENT USE OF INSULIN (H): ICD-10-CM

## 2018-01-16 DIAGNOSIS — N39.0 RECURRENT UTI (URINARY TRACT INFECTION): ICD-10-CM

## 2018-01-16 DIAGNOSIS — I10 HYPERTENSION GOAL BP (BLOOD PRESSURE) < 140/90: ICD-10-CM

## 2018-01-16 DIAGNOSIS — E78.5 HYPERLIPIDEMIA LDL GOAL <160: ICD-10-CM

## 2018-01-17 ENCOUNTER — TELEPHONE (OUTPATIENT)
Dept: FAMILY MEDICINE | Facility: OTHER | Age: 83
End: 2018-01-17

## 2018-01-17 RX ORDER — LOVASTATIN 10 MG
TABLET ORAL
Qty: 28 TABLET | Refills: 0 | OUTPATIENT
Start: 2018-01-17

## 2018-01-17 RX ORDER — METFORMIN HCL 500 MG
TABLET, EXTENDED RELEASE 24 HR ORAL
Qty: 56 TABLET | Refills: 0 | OUTPATIENT
Start: 2018-01-17

## 2018-01-17 RX ORDER — AMLODIPINE BESYLATE 5 MG/1
TABLET ORAL
Qty: 28 TABLET | Refills: 0 | OUTPATIENT
Start: 2018-01-17

## 2018-01-17 NOTE — PROGRESS NOTES
1-16-18    Notified by Hospice  that client passed away on 1-14-18 at Group Home.    PCP notified by Hospice.  Called all providers to cancel services. Notified Nel at [facility] of client's death and completed disenrollment checklist.   Called family member/caregiver to offer condolences.     Chart reviewed, notes printed and this CM's encounter closed. Chart handed off to CMS to process disenrollment tasks.  Honey Borrego MA [facility] Care Coordinator   [phone]

## 2018-01-17 NOTE — TELEPHONE ENCOUNTER
"Requested Prescriptions   Pending Prescriptions Disp Refills     amLODIPine (NORVASC) 5 MG tablet [Pharmacy Med Name: AMLODIPINE 5 MG TAB] 28 tablet 0     Sig: TAKE 1 TABLET BY MOUTH ONCE DAILY    Calcium Channel Blockers Protocol  Passed    1/16/2018  1:18 PM       Passed - Blood pressure under 140/90    BP Readings from Last 3 Encounters:   10/31/17 116/62   07/28/17 102/50   06/22/17 120/50                Passed - Recent or future visit with authorizing provider    Patient had office visit in the last year or has a visit in the next 30 days with authorizing provider.  See \"Patient Info\" tab in inbasket, or \"Choose Columns\" in Meds & Orders section of the refill encounter.              Passed - Patient is age 18 or older       Passed - No active pregnancy on record       Passed - Normal serum creatinine on file in past 12 months    Recent Labs   Lab Test  11/18/17   0900   CR  0.62            Passed - No positive pregnancy test in past 12 months        cephalexin (KEFLEX) 250 MG capsule [Pharmacy Med Name: CEPHALEXIN 250 MG CAPSULE] 28 capsule 0     Sig: TAKE 1 CAPSULE BY MOUTH ONCE DAILY    There is no refill protocol information for this order        lovastatin (MEVACOR) 10 MG tablet [Pharmacy Med Name: LOVASTATIN 10MG TAB] 28 tablet 0     Sig: TAKE 1 TABLET BY MOUTH AT BEDTIME    Statins Protocol Passed    1/16/2018  1:18 PM       Passed - LDL on file in past 12 months    Recent Labs   Lab Test  10/31/17   1514   LDL  62            Passed - No abnormal creatine kinase in past 12 months    No lab results found.         Passed - Recent or future visit with authorizing provider    Patient had office visit in the last year or has a visit in the next 30 days with authorizing provider.  See \"Patient Info\" tab in inbasket, or \"Choose Columns\" in Meds & Orders section of the refill encounter.              Passed - Patient is age 18 or older       Passed - No active pregnancy on record       Passed - No positive " "pregnancy test in past 12 months        metFORMIN (GLUCOPHAGE-XR) 500 MG 24 hr tablet [Pharmacy Med Name: METFORMIN  MG TABLET] 56 tablet 0     Sig: TAKE 2 TABLETS BY MOUTH ONCE DAILY    Biguanide Agents Passed    1/16/2018  1:18 PM       Passed - Patient's BP is less than 140/90    BP Readings from Last 3 Encounters:   10/31/17 116/62   07/28/17 102/50   06/22/17 120/50                Passed - Patient has documented LDL within the past 12 mos.    Recent Labs   Lab Test  10/31/17   1514   LDL  62            Passed - Patient has had a Microalbumin in the past 12 mos.    Recent Labs   Lab Test  11/01/17   0840 05/23/16   MICROALB   --   50.3   MICROL  28   --    UMALCR  93.96*  100            Passed - Patient is age 10 or older       Passed - Patient has documented A1c within the specified period of time.    Recent Labs   Lab Test  10/31/17   1514   A1C  7.8*            Passed - Patient's CR is NOT>1.4 OR Patient's EGFR is NOT<45 within past 12 mos.    Recent Labs   Lab Test  11/18/17   0900   GFRESTIMATED  >90   GFRESTBLACK  >90       Recent Labs   Lab Test  11/18/17   0900   CR  0.62            Passed - Patient does NOT have a diagnosis of CHF.       Passed - Patient is not pregnant       Passed - Patient has not had a positive pregnancy test within the past 12 mos.        Passed - Recent (6 mos) or future visit with authorizing provider's specialty    Patient had office visit in the last 6 months or has a visit in the next 30 days with authorizing provider.  See \"Patient Info\" tab in inbasket, or \"Choose Columns\" in Meds & Orders section of the refill encounter.           Last Written Prescription Date:  7/12/17  Last Fill Quantity: 28,  # refills: 5   Last Office Visit with [facility], [facility] or  Health prescribing provider:  10/31/17   Future Office Visit:         Last Written Prescription Date:  11/29/17  Last Fill Quantity: 28,  # refills: 0   Last Office Visit with [facility], [facility] or  Health prescribing provider:  " 10/31/17   Future Office Visit:           Last Written Prescription Date:  11/30/17  Last Fill Quantity: 56,  # refills: 0   Last Office Visit with [facility], [facility] or  Health prescribing provider:  10/31/17   Future Office Visit:         Cephalexin      Last Written Prescription Date:  11/29/17  Last Fill Quantity: 28,   # refills: 0  Last Office Visit: 10/31/17  Future Office visit:       Routing refill request to provider for review/approval because:  Drug not on the [facility], P or  Health refill protocol or controlled substance
